# Patient Record
Sex: FEMALE | Race: WHITE | NOT HISPANIC OR LATINO | URBAN - METROPOLITAN AREA
[De-identification: names, ages, dates, MRNs, and addresses within clinical notes are randomized per-mention and may not be internally consistent; named-entity substitution may affect disease eponyms.]

---

## 2017-01-17 ENCOUNTER — OUTPATIENT (OUTPATIENT)
Dept: OUTPATIENT SERVICES | Facility: HOSPITAL | Age: 77
LOS: 1 days | End: 2017-01-17

## 2017-01-17 ENCOUNTER — INPATIENT (INPATIENT)
Facility: HOSPITAL | Age: 77
LOS: 2 days | Discharge: NURSING, REHAB OR LONG-TERM CARE | End: 2017-01-20
Payer: MEDICARE

## 2017-01-17 PROCEDURE — 73560 X-RAY EXAM OF KNEE 1 OR 2: CPT | Mod: 26,LT

## 2017-01-18 ENCOUNTER — OUTPATIENT (OUTPATIENT)
Dept: OUTPATIENT SERVICES | Facility: HOSPITAL | Age: 77
LOS: 1 days | End: 2017-01-18

## 2017-01-19 ENCOUNTER — OUTPATIENT (OUTPATIENT)
Dept: OUTPATIENT SERVICES | Facility: HOSPITAL | Age: 77
LOS: 1 days | End: 2017-01-19

## 2017-01-20 ENCOUNTER — INPATIENT (INPATIENT)
Facility: HOSPITAL | Age: 77
LOS: 11 days | Discharge: HOME | End: 2017-02-01

## 2017-02-01 ENCOUNTER — OUTPATIENT (OUTPATIENT)
Dept: OUTPATIENT SERVICES | Facility: HOSPITAL | Age: 77
LOS: 1 days | End: 2017-02-01

## 2017-02-24 ENCOUNTER — TRANSCRIPTION ENCOUNTER (OUTPATIENT)
Age: 77
End: 2017-02-24

## 2017-07-10 ENCOUNTER — APPOINTMENT (OUTPATIENT)
Dept: CARDIOLOGY | Facility: CLINIC | Age: 77
End: 2017-07-10

## 2017-07-20 ENCOUNTER — APPOINTMENT (OUTPATIENT)
Dept: CARDIOLOGY | Facility: CLINIC | Age: 77
End: 2017-07-20

## 2017-08-28 ENCOUNTER — APPOINTMENT (OUTPATIENT)
Dept: CARDIOLOGY | Facility: CLINIC | Age: 77
End: 2017-08-28
Payer: MEDICARE

## 2017-08-28 PROCEDURE — 99214 OFFICE O/P EST MOD 30 MIN: CPT

## 2017-09-26 ENCOUNTER — APPOINTMENT (OUTPATIENT)
Dept: CARDIOLOGY | Facility: CLINIC | Age: 77
End: 2017-09-26
Payer: MEDICARE

## 2017-09-26 PROCEDURE — 99213 OFFICE O/P EST LOW 20 MIN: CPT

## 2017-10-05 ENCOUNTER — APPOINTMENT (OUTPATIENT)
Dept: CARDIOLOGY | Facility: CLINIC | Age: 77
End: 2017-10-05
Payer: MEDICARE

## 2017-10-05 PROCEDURE — 99213 OFFICE O/P EST LOW 20 MIN: CPT

## 2017-10-10 ENCOUNTER — APPOINTMENT (OUTPATIENT)
Dept: CARDIOLOGY | Facility: CLINIC | Age: 77
End: 2017-10-10
Payer: MEDICARE

## 2017-10-10 PROCEDURE — 93351 STRESS TTE COMPLETE: CPT

## 2017-10-23 ENCOUNTER — APPOINTMENT (OUTPATIENT)
Dept: CARDIOLOGY | Facility: CLINIC | Age: 77
End: 2017-10-23
Payer: MEDICARE

## 2017-10-23 PROCEDURE — 99214 OFFICE O/P EST MOD 30 MIN: CPT

## 2017-11-02 ENCOUNTER — OUTPATIENT (OUTPATIENT)
Dept: OUTPATIENT SERVICES | Facility: HOSPITAL | Age: 77
LOS: 1 days | End: 2017-11-02
Payer: MEDICARE

## 2017-11-02 ENCOUNTER — OUTPATIENT (OUTPATIENT)
Dept: OUTPATIENT SERVICES | Facility: HOSPITAL | Age: 77
LOS: 1 days | End: 2017-11-02

## 2017-11-02 PROCEDURE — 93458 L HRT ARTERY/VENTRICLE ANGIO: CPT | Mod: 26

## 2017-11-06 ENCOUNTER — APPOINTMENT (OUTPATIENT)
Dept: CARDIOLOGY | Facility: CLINIC | Age: 77
End: 2017-11-06
Payer: MEDICARE

## 2017-11-06 PROCEDURE — 99214 OFFICE O/P EST MOD 30 MIN: CPT

## 2017-11-21 ENCOUNTER — RX RENEWAL (OUTPATIENT)
Age: 77
End: 2017-11-21

## 2017-11-29 ENCOUNTER — RECORD ABSTRACTING (OUTPATIENT)
Age: 77
End: 2017-11-29

## 2017-12-08 ENCOUNTER — APPOINTMENT (OUTPATIENT)
Dept: CARDIOLOGY | Facility: CLINIC | Age: 77
End: 2017-12-08

## 2018-04-27 ENCOUNTER — RECORD ABSTRACTING (OUTPATIENT)
Age: 78
End: 2018-04-27

## 2018-05-07 ENCOUNTER — APPOINTMENT (OUTPATIENT)
Dept: CARDIOLOGY | Facility: CLINIC | Age: 78
End: 2018-05-07
Payer: MEDICARE

## 2018-05-07 VITALS
HEART RATE: 62 BPM | SYSTOLIC BLOOD PRESSURE: 110 MMHG | DIASTOLIC BLOOD PRESSURE: 60 MMHG | WEIGHT: 144 LBS | HEIGHT: 60 IN | OXYGEN SATURATION: 99 % | BODY MASS INDEX: 28.27 KG/M2

## 2018-05-07 DIAGNOSIS — Z87.39 PERSONAL HISTORY OF OTHER DISEASES OF THE MUSCULOSKELETAL SYSTEM AND CONNECTIVE TISSUE: ICD-10-CM

## 2018-05-07 DIAGNOSIS — I47.2 VENTRICULAR TACHYCARDIA: ICD-10-CM

## 2018-05-07 DIAGNOSIS — Z87.891 PERSONAL HISTORY OF NICOTINE DEPENDENCE: ICD-10-CM

## 2018-05-07 DIAGNOSIS — Z86.79 PERSONAL HISTORY OF OTHER DISEASES OF THE CIRCULATORY SYSTEM: ICD-10-CM

## 2018-05-07 DIAGNOSIS — Z78.9 OTHER SPECIFIED HEALTH STATUS: ICD-10-CM

## 2018-05-07 PROCEDURE — 99214 OFFICE O/P EST MOD 30 MIN: CPT

## 2018-05-07 RX ORDER — AMLODIPINE BESYLATE 5 MG/1
5 TABLET ORAL
Refills: 0 | Status: DISCONTINUED | COMMUNITY
End: 2018-05-07

## 2018-05-07 RX ORDER — METOPROLOL SUCCINATE 25 MG/1
25 TABLET, EXTENDED RELEASE ORAL DAILY
Qty: 90 | Refills: 1 | Status: DISCONTINUED | COMMUNITY
Start: 2017-11-21 | End: 2018-05-07

## 2018-07-05 ENCOUNTER — RX RENEWAL (OUTPATIENT)
Age: 78
End: 2018-07-05

## 2019-01-07 ENCOUNTER — RX RENEWAL (OUTPATIENT)
Age: 79
End: 2019-01-07

## 2019-05-13 ENCOUNTER — APPOINTMENT (OUTPATIENT)
Dept: CARDIOLOGY | Facility: CLINIC | Age: 79
End: 2019-05-13

## 2019-06-24 ENCOUNTER — APPOINTMENT (OUTPATIENT)
Dept: CARDIOLOGY | Facility: CLINIC | Age: 79
End: 2019-06-24

## 2019-07-09 ENCOUNTER — NON-APPOINTMENT (OUTPATIENT)
Age: 79
End: 2019-07-09

## 2019-07-09 ENCOUNTER — APPOINTMENT (OUTPATIENT)
Dept: CARDIOLOGY | Facility: CLINIC | Age: 79
End: 2019-07-09
Payer: MEDICARE

## 2019-07-09 VITALS
DIASTOLIC BLOOD PRESSURE: 70 MMHG | OXYGEN SATURATION: 98 % | HEIGHT: 60 IN | WEIGHT: 133 LBS | SYSTOLIC BLOOD PRESSURE: 152 MMHG | BODY MASS INDEX: 26.11 KG/M2 | HEART RATE: 66 BPM

## 2019-07-09 PROCEDURE — 93000 ELECTROCARDIOGRAM COMPLETE: CPT

## 2019-07-09 PROCEDURE — 99214 OFFICE O/P EST MOD 30 MIN: CPT

## 2019-07-09 RX ORDER — CHOLESTYRAMINE 4 G/9G
4 POWDER, FOR SUSPENSION ORAL DAILY
Refills: 0 | Status: DISCONTINUED | COMMUNITY
End: 2019-07-09

## 2019-07-09 RX ORDER — TOBRAMYCIN AND DEXAMETHASONE 3; 1 MG/ML; MG/ML
0.3-0.1 SUSPENSION/ DROPS OPHTHALMIC
Qty: 5 | Refills: 0 | Status: DISCONTINUED | COMMUNITY
Start: 2017-09-13 | End: 2019-07-09

## 2019-07-09 RX ORDER — ROSUVASTATIN CALCIUM 5 MG/1
5 TABLET, FILM COATED ORAL
Refills: 0 | Status: DISCONTINUED | COMMUNITY
End: 2019-07-09

## 2019-07-09 RX ORDER — METOPROLOL SUCCINATE 100 MG/1
100 TABLET, EXTENDED RELEASE ORAL
Qty: 45 | Refills: 3 | Status: DISCONTINUED | COMMUNITY
Start: 2018-07-05 | End: 2019-07-09

## 2019-07-09 RX ORDER — ASPIRIN 81 MG
81 TABLET, DELAYED RELEASE (ENTERIC COATED) ORAL DAILY
Qty: 30 | Refills: 3 | Status: DISCONTINUED | COMMUNITY
End: 2019-07-09

## 2019-07-09 RX ORDER — DICYCLOMINE HYDROCHLORIDE 20 MG/1
20 TABLET ORAL DAILY
Refills: 0 | Status: DISCONTINUED | COMMUNITY
End: 2019-07-09

## 2019-07-09 RX ORDER — VENLAFAXINE HCL 75 MG
75 TABLET ORAL DAILY
Refills: 0 | Status: DISCONTINUED | COMMUNITY
End: 2019-07-09

## 2019-07-09 RX ORDER — MIRTAZAPINE 30 MG/1
30 TABLET, FILM COATED ORAL
Refills: 0 | Status: DISCONTINUED | COMMUNITY
End: 2019-07-09

## 2019-07-09 RX ORDER — VALSARTAN 320 MG/1
320 TABLET ORAL DAILY
Refills: 0 | Status: DISCONTINUED | COMMUNITY
End: 2019-07-09

## 2019-07-09 RX ORDER — MIRTAZAPINE 15 MG/1
15 TABLET, FILM COATED ORAL
Qty: 90 | Refills: 0 | Status: DISCONTINUED | COMMUNITY
Start: 2017-06-19 | End: 2019-07-09

## 2019-07-09 NOTE — HISTORY OF PRESENT ILLNESS
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 Class I. Functional status.\par NSVT EP no recurrence. No syncopal episode.\par Hypertension. Recent changes in medication. \par Hyperlipidemia. On cholesterol-lowering agent be a much better control.\par fibromyalgia mild-mod active controlled\par statin intolerance\par thyroid nodules

## 2019-07-09 NOTE — PHYSICAL EXAM
[Normal Appearance] : normal appearance [General Appearance - Well Developed] : well developed [General Appearance - Well Nourished] : well nourished [Well Groomed] : well groomed [No Deformities] : no deformities [Eyelids - No Xanthelasma] : the eyelids demonstrated no xanthelasmas [General Appearance - In No Acute Distress] : no acute distress [Normal Conjunctiva] : the conjunctiva exhibited no abnormalities [No Oral Pallor] : no oral pallor [Normal Jugular Venous V Waves Present] : normal jugular venous V waves present [Normal Jugular Venous A Waves Present] : normal jugular venous A waves present [No Jugular Venous Jacinto A Waves] : no jugular venous jacinto A waves [Heart Rate And Rhythm] : heart rate and rhythm were normal [Exaggerated Use Of Accessory Muscles For Inspiration] : no accessory muscle use [Auscultation Breath Sounds / Voice Sounds] : lungs were clear to auscultation bilaterally [Respiration, Rhythm And Depth] : normal respiratory rhythm and effort [Abdomen Soft] : soft [Murmurs] : no murmurs present [Heart Sounds] : normal S1 and S2 [] : no rash [Cyanosis, Localized] : no localized cyanosis [Abnormal Walk] : normal gait [Nail Clubbing] : no clubbing of the fingernails [Mood] : the mood was normal [Affect] : the affect was normal [Oriented To Time, Place, And Person] : oriented to person, place, and time [No Anxiety] : not feeling anxious

## 2019-07-09 NOTE — DISCUSSION/SUMMARY
[FreeTextEntry1] : #1 nonischemic cardiomyopathy. Preserved systolic function. Possible and function surface. Increase and coronary angiography, which was normal. Continue lifestyle and risk factor modifications.\par #2 hypertensive heart diseaseWithout any congestive heart failure. Mild renal insufficiency. Nonsmoker. Uncontrolled blood pressure. Possible noncompliance. Increase metoprolol back to 50 mg. Follow blood pressure. If remains uncontrolled. Would change metoprolol to labetalol or consider addition of amlodipine to the present regimen.\par She will also have an echocardiogram for followup on an ejection fraction of motion valvular regurgitation and pulmonary artery systolic pressure.\par #3 hyperlipidemia. Low dose of cholesterol-lowering agent. Unable to tolerate higher dose of medications. 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 No signs of left to right heart failure. Continue blood pressure, heart rate control.\par #5 Considering no significant symptomatic or obstructive vascular disease. After reviewing the risks and benefits of aspirin. She has decided to stop aspirin therapy.\par \par Considering no significant symptomatic or obstructive vascular disease. After reviewing the risks and benefits of aspirin. She has decided to stop aspirin therapy.\par I have reviewed above at length. I answered all the questions. Patient verbalized understanding\par Thank you very much for allowing me to participate in your patient's care. Please feel free to call me for any questions.\par Follow up otherwise in one month or for any change in her symptoms.\par

## 2019-07-09 NOTE — ASSESSMENT
[FreeTextEntry1] : \par past tests for reference:\par \par Invasive coronary angiography was reviewed. Showed normal coronaries. 2017\par \par cardiac cath 2013- no obstructive CAD\par Echocardiogram 2/11/2016.  LV ejection fraction 60%.  Borderline left atrial enlargement.  Fibrocalcific aortic valve with normal opening.  Trace mitral and tricuspid regurgitation.  Overall no significant change is noted.\par \par Echocardiogram dated July 21, 2017 and ejection fraction 60-65%. Left atrial size 4.1. Sigmoid septum. Mild diastolic LV dysfunction. Pulmonary artery systolic pressure 33 mm mercury.\par \par Stress echocardiogram October 10, 2017 . 4 minutes and 13 seconds. 5 METs. Abnormal EKG changes suggestive of ischemia. Normal blood pressure and heart rate response. No chest pain.\par Echocardiogram portion of the test showed hypokinesis of mid inferolateral region, suggestive of ischemia\par \par Reviewed July 9 thousand 19\par EKG. July 9, 2019 p.m., dictation, hypertension, for interpretation, normal sinus rhythm.\par Labs , triglycerides 63, sodium 142, potassium 4.2, creatinine 1.01 LFT normal. Total cholesterol 180 TSH 1.13 HDL 50

## 2019-07-09 NOTE — REASON FOR VISIT
[Follow-Up - Clinic] : a clinic follow-up of [Hyperlipidemia] : hyperlipidemia [Hypertension] : hypertension [FreeTextEntry1] : 79-year-old female comes in for follow up consultation to review labs.\par Denies any complaint of chest pain, shortness of breath, PND, orthopnea, palpitation.\par Has been active. Has stable weight.\par She has been taking lower dose of metoprolol at 25 mg.\par She had recent labs done in your office.\par She is not taking her Crestor recently.\par She doesn't easy bruising on aspirin.\par No recent hospitalization\par She had early morning fatigue, which has improved after stopping antidepressant

## 2019-07-09 NOTE — REVIEW OF SYSTEMS
[see HPI] : see HPI [Joint Pain] : joint pain [Joint Stiffness] : joint stiffness [Negative] : Heme/Lymph [Muscle Cramps] : no muscle cramps [Joint Swelling] : no joint swelling [Limb Weakness (Paresis)] : no limb weakness

## 2019-08-07 ENCOUNTER — APPOINTMENT (OUTPATIENT)
Dept: CARDIOLOGY | Facility: CLINIC | Age: 79
End: 2019-08-07
Payer: MEDICARE

## 2019-08-07 VITALS
SYSTOLIC BLOOD PRESSURE: 148 MMHG | BODY MASS INDEX: 26.7 KG/M2 | OXYGEN SATURATION: 96 % | DIASTOLIC BLOOD PRESSURE: 66 MMHG | HEART RATE: 67 BPM | WEIGHT: 136 LBS | HEIGHT: 60 IN

## 2019-08-07 PROCEDURE — 99214 OFFICE O/P EST MOD 30 MIN: CPT

## 2019-08-07 PROCEDURE — 93306 TTE W/DOPPLER COMPLETE: CPT

## 2019-08-07 RX ORDER — CHLORTHALIDONE 50 MG/1
50 TABLET ORAL DAILY
Refills: 0 | Status: DISCONTINUED | COMMUNITY
End: 2019-08-07

## 2019-08-07 RX ORDER — METOPROLOL SUCCINATE 50 MG/1
50 TABLET, EXTENDED RELEASE ORAL
Qty: 90 | Refills: 3 | Status: DISCONTINUED | COMMUNITY
End: 2019-08-07

## 2019-08-07 NOTE — ASSESSMENT
[FreeTextEntry1] : \par past tests for reference:\par \par Invasive coronary angiography was reviewed. Showed normal coronaries. 2017\par \par cardiac cath 2013- no obstructive CAD\par Echocardiogram 2/11/2016.  LV ejection fraction 60%.  Borderline left atrial enlargement.  Fibrocalcific aortic valve with normal opening.  Trace mitral and tricuspid regurgitation.  Overall no significant change is noted.\par \par Echocardiogram dated July 21, 2017 and ejection fraction 60-65%. Left atrial size 4.1. Sigmoid septum. Mild diastolic LV dysfunction. Pulmonary artery systolic pressure 33 mm mercury.\par \par Stress echocardiogram October 10, 2017 . 4 minutes and 13 seconds. 5 METs. Abnormal EKG changes suggestive of ischemia. Normal blood pressure and heart rate response. No chest pain.\par Echocardiogram portion of the test showed hypokinesis of mid inferolateral region, suggestive of ischemia\par \par EKG. July 9, 2019 ., dictation, hypertension, for interpretation, normal sinus rhythm.\par Labs , triglycerides 63, sodium 142, potassium 4.2, creatinine 1.01 LFT normal. Total cholesterol 180 TSH 1.13 HDL 50\par \par Reviewed on August 7, 2019.\par Echocardiogram 8/7/19 ventricle ejection fraction 60% minimal mitral insufficiency Moderate left atrial enlargement, mild diastolic dysfunction, pulmonary pressures of 32 mm, mild tricuspid insufficiency

## 2019-08-07 NOTE — DISCUSSION/SUMMARY
[FreeTextEntry1] : #1 nonischemic cardiomyopathy. Preserved systolic function. Coronary angiography showed no significant obstructive diseaseContinue lifestyle and risk factor modifications.\par #2 hypertensive heart disease Without any congestive heart failure. Mild renal insufficiency. Nonsmoker. Uncontrolled blood pressure.Recommended continued metoprolol 25 mg and  restart chlorthalidone at 25 mg. If continued on control blood pressure then I would change metoprolol to labetalol or change metoprolol to amlodipine at the next office visit.\par #3 hyperlipidemia. Low dose of cholesterol-lowering agent. Unable to tolerate higher dose of medications. 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 No signs of left to right heart failure. Continue blood pressure, heart rate control. No significant worsening noted. There is moderate left atrial enlargement, which could be related to hypertensive heart disease.\par \par \par Considering no significant symptomatic or obstructive vascular disease. After reviewing the risks and benefits of aspirin. She has decided to stop aspirin therapy.\par I have reviewed above at length. I answered all the questions. Patient verbalized understanding\par Thank you very much for allowing me to participate in your patient's care. Please feel free to call me for any questions.\par Follow up otherwise in one month or for any change in her symptoms.\par

## 2019-08-07 NOTE — REVIEW OF SYSTEMS
[Joint Pain] : joint pain [see HPI] : see HPI [Joint Swelling] : no joint swelling [Joint Stiffness] : joint stiffness [Muscle Cramps] : no muscle cramps [Limb Weakness (Paresis)] : no limb weakness [Negative] : Heme/Lymph

## 2019-08-07 NOTE — REASON FOR VISIT
[Follow-Up - Clinic] : a clinic follow-up of [Hyperlipidemia] : hyperlipidemia [Hypertension] : hypertension [Medication Management] : Medication management [FreeTextEntry1] : 79-year-old female comes in for followup consultation today view echocardiogram.\par Since last seen it appears she could not tolerate metoprolol at 50 mg and she has cut it down to 25 mg with better tolerance.\par She also stopped taking chlorthalidone, as she talked it is giving her diarrhea. There is some improvement, but not significant.\par Denies any complaint of chest pain, shortness of breath, PND, orthopnea, palpitation.\par Has been active. Has stable weight.\par She is taking her rosuvastatin 5 mg daily\par She doesn't Take aspirin because of easy bruising\par No recent hospitalization\par She had early morning fatigue, which has improved after stopping antidepressant

## 2019-08-07 NOTE — PHYSICAL EXAM
[Normal Appearance] : normal appearance [General Appearance - Well Developed] : well developed [Well Groomed] : well groomed [General Appearance - Well Nourished] : well nourished [No Deformities] : no deformities [General Appearance - In No Acute Distress] : no acute distress [Normal Conjunctiva] : the conjunctiva exhibited no abnormalities [Eyelids - No Xanthelasma] : the eyelids demonstrated no xanthelasmas [Normal Jugular Venous A Waves Present] : normal jugular venous A waves present [No Oral Pallor] : no oral pallor [Normal Jugular Venous V Waves Present] : normal jugular venous V waves present [No Jugular Venous Jacinto A Waves] : no jugular venous jacinto A waves [Respiration, Rhythm And Depth] : normal respiratory rhythm and effort [Auscultation Breath Sounds / Voice Sounds] : lungs were clear to auscultation bilaterally [Exaggerated Use Of Accessory Muscles For Inspiration] : no accessory muscle use [Heart Sounds] : normal S1 and S2 [Heart Rate And Rhythm] : heart rate and rhythm were normal [Abdomen Soft] : soft [Murmurs] : no murmurs present [Abnormal Walk] : normal gait [Cyanosis, Localized] : no localized cyanosis [Nail Clubbing] : no clubbing of the fingernails [Oriented To Time, Place, And Person] : oriented to person, place, and time [] : no rash [Mood] : the mood was normal [Affect] : the affect was normal [No Anxiety] : not feeling anxious

## 2019-09-03 ENCOUNTER — APPOINTMENT (OUTPATIENT)
Dept: CARDIOLOGY | Facility: CLINIC | Age: 79
End: 2019-09-03
Payer: MEDICARE

## 2019-09-03 VITALS
SYSTOLIC BLOOD PRESSURE: 138 MMHG | DIASTOLIC BLOOD PRESSURE: 72 MMHG | BODY MASS INDEX: 26.5 KG/M2 | WEIGHT: 135 LBS | OXYGEN SATURATION: 96 % | HEART RATE: 52 BPM | HEIGHT: 60 IN

## 2019-09-03 PROCEDURE — 99214 OFFICE O/P EST MOD 30 MIN: CPT

## 2019-09-03 RX ORDER — METOPROLOL SUCCINATE 25 MG/1
25 TABLET, EXTENDED RELEASE ORAL DAILY
Refills: 0 | Status: DISCONTINUED | COMMUNITY
End: 2019-09-03

## 2019-09-03 NOTE — REVIEW OF SYSTEMS
[Joint Pain] : joint pain [see HPI] : see HPI [Joint Stiffness] : joint stiffness [Negative] : Heme/Lymph [Joint Swelling] : no joint swelling [Muscle Cramps] : no muscle cramps [Limb Weakness (Paresis)] : no limb weakness

## 2019-09-03 NOTE — REASON FOR VISIT
[Hyperlipidemia] : hyperlipidemia [Follow-Up - Clinic] : a clinic follow-up of [Hypertension] : hypertension [Medication Management] : Medication management [FreeTextEntry1] : 79-year-old female comes in for followup consultation for medical management. After recent changes in her metoprolol and chlorthalidone. It appears decreasing dose of chlorthalidone and 25 mg did not make a difference in her diarrhea. It also appears that she did not feel well on metoprolol at 25 mg and now back to 50 mg with improved nocturnal symptoms.\par She is now seen gastroenterologist yet.\par Denies any complaint of chest pain, shortness of breath, PND, orthopnea, palpitation.\par Has been active. Has stable weight.\par She is taking her rosuvastatin 5 mg daily\par She doesn't Take aspirin because of easy bruising\par No recent hospitalization\par She had early morning fatigue, which has improved after stopping antidepressant

## 2019-09-03 NOTE — PHYSICAL EXAM
[Normal Appearance] : normal appearance [General Appearance - Well Developed] : well developed [Well Groomed] : well groomed [No Deformities] : no deformities [General Appearance - Well Nourished] : well nourished [Normal Conjunctiva] : the conjunctiva exhibited no abnormalities [General Appearance - In No Acute Distress] : no acute distress [No Oral Pallor] : no oral pallor [Eyelids - No Xanthelasma] : the eyelids demonstrated no xanthelasmas [Normal Jugular Venous A Waves Present] : normal jugular venous A waves present [Normal Jugular Venous V Waves Present] : normal jugular venous V waves present [No Jugular Venous Jacinto A Waves] : no jugular venous jacinto A waves [Exaggerated Use Of Accessory Muscles For Inspiration] : no accessory muscle use [Respiration, Rhythm And Depth] : normal respiratory rhythm and effort [Auscultation Breath Sounds / Voice Sounds] : lungs were clear to auscultation bilaterally [Heart Rate And Rhythm] : heart rate and rhythm were normal [Murmurs] : no murmurs present [Heart Sounds] : normal S1 and S2 [Abdomen Soft] : soft [Nail Clubbing] : no clubbing of the fingernails [Abnormal Walk] : normal gait [] : no rash [Cyanosis, Localized] : no localized cyanosis [Mood] : the mood was normal [Affect] : the affect was normal [Oriented To Time, Place, And Person] : oriented to person, place, and time [No Anxiety] : not feeling anxious

## 2020-01-06 ENCOUNTER — APPOINTMENT (OUTPATIENT)
Dept: CARDIOLOGY | Facility: CLINIC | Age: 80
End: 2020-01-06
Payer: MEDICARE

## 2020-01-06 VITALS
RESPIRATION RATE: 16 BRPM | SYSTOLIC BLOOD PRESSURE: 120 MMHG | HEART RATE: 68 BPM | HEIGHT: 65 IN | DIASTOLIC BLOOD PRESSURE: 68 MMHG | WEIGHT: 140 LBS | BODY MASS INDEX: 23.32 KG/M2

## 2020-01-06 VITALS — OXYGEN SATURATION: 96 % | DIASTOLIC BLOOD PRESSURE: 64 MMHG | SYSTOLIC BLOOD PRESSURE: 120 MMHG

## 2020-01-06 PROCEDURE — 99214 OFFICE O/P EST MOD 30 MIN: CPT

## 2020-01-06 NOTE — DISCUSSION/SUMMARY
[FreeTextEntry1] : 79-year-old female comes in for followup consultation with above medical history active medical problems as noted below.\par #1 dizziness. Movement of the neck. No vertiginous symptoms. No orthostatic shifts. Possible vertebral basilar insufficiency. Recommended labs.\par Recommended carotid/vertebral arterial Doppler study.\par If above tests are negative, then consider further evaluation with ENT/neurologist, and we will have a Holter monitor to rule out any cardiac arrhythmias.\par #2 nonischemic cardiomyopathy. Preserved systolic function. Coronary angiography showed no significant obstructive diseaseContinue lifestyle and risk factor modifications.\par #3 hypertensive heart disease Without any congestive heart failure. Mild renal insufficiency. Nonsmoker. Blood pressure is stable. Continue present medications.\par She will take low-salt diet.\par She will increase her exercises.\par #3 hyperlipidemia. Low dose of cholesterol-lowering agent. Unable to tolerate higher dose of medications. 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 No signs of left to right heart failure. Continue blood pressure, heart rate control. No significant worsening noted. There is moderate left atrial enlargement, which could be related to hypertensive heart disease.\par Compliance with medication and diet have been discussed.\par \par I have reviewed above at length. I answered all the questions. Patient verbalized understanding\par Thank you very much for allowing me to participate in your patient's care. Please feel free to call me for any questions.\par \par

## 2020-01-06 NOTE — PHYSICAL EXAM
[Normal Appearance] : normal appearance [Well Groomed] : well groomed [General Appearance - Well Developed] : well developed [General Appearance - Well Nourished] : well nourished [No Deformities] : no deformities [General Appearance - In No Acute Distress] : no acute distress [Normal Conjunctiva] : the conjunctiva exhibited no abnormalities [No Oral Pallor] : no oral pallor [Eyelids - No Xanthelasma] : the eyelids demonstrated no xanthelasmas [Normal Jugular Venous A Waves Present] : normal jugular venous A waves present [No Jugular Venous Jacinto A Waves] : no jugular venous jacinto A waves [Normal Jugular Venous V Waves Present] : normal jugular venous V waves present [Respiration, Rhythm And Depth] : normal respiratory rhythm and effort [Exaggerated Use Of Accessory Muscles For Inspiration] : no accessory muscle use [Auscultation Breath Sounds / Voice Sounds] : lungs were clear to auscultation bilaterally [Heart Rate And Rhythm] : heart rate and rhythm were normal [Heart Sounds] : normal S1 and S2 [Abdomen Soft] : soft [Abnormal Walk] : normal gait [Murmurs] : no murmurs present [Nail Clubbing] : no clubbing of the fingernails [Cyanosis, Localized] : no localized cyanosis [] : no rash [Oriented To Time, Place, And Person] : oriented to person, place, and time [Affect] : the affect was normal [Mood] : the mood was normal [No Anxiety] : not feeling anxious

## 2020-01-06 NOTE — REVIEW OF SYSTEMS
[Joint Pain] : joint pain [Joint Swelling] : no joint swelling [Joint Stiffness] : joint stiffness [Limb Weakness (Paresis)] : no limb weakness [Muscle Cramps] : no muscle cramps [see HPI] : see HPI [Dizziness] : dizziness [Negative] : Heme/Lymph

## 2020-01-06 NOTE — REASON FOR VISIT
[Dizziness] : dizziness [Follow-Up - Clinic] : a clinic follow-up of [Hypertension] : hypertension [Hyperlipidemia] : hyperlipidemia [FreeTextEntry1] : 79-year-old female comes in for followup consultation did complain of intermittent dizziness. Mainly, with quick changes in the movement of the neck without any vertiginous symptoms, associated gait abnormality, fall, headache, visual disturbances, palpitations, chest pain.\par She has been stable in relation to adjacent exertion without any PND, orthopnea, or pedal edema.\par Her diarrheal illness, is stable on low material.\par She, states she's been taking most of her medications regularly.\par She has been active and going to local gym on a regular basis. Still not during holidays,  just started today Again\par Has been active. Has stable weight.\par She is taking her rosuvastatin 5 mg daily\par She doesn't Take aspirin because of easy bruising\par She has been taking her antidepressants \par No recent hospitalization\par  [Medication Management] : Medication management

## 2020-01-09 ENCOUNTER — APPOINTMENT (OUTPATIENT)
Dept: CARDIOLOGY | Facility: CLINIC | Age: 80
End: 2020-01-09
Payer: MEDICARE

## 2020-01-09 PROCEDURE — 93880 EXTRACRANIAL BILAT STUDY: CPT

## 2020-01-13 ENCOUNTER — RESULT REVIEW (OUTPATIENT)
Age: 80
End: 2020-01-13

## 2020-01-13 RX ORDER — ROSUVASTATIN CALCIUM 5 MG/1
5 TABLET, FILM COATED ORAL
Qty: 45 | Refills: 3 | Status: DISCONTINUED | COMMUNITY
Start: 2019-07-09 | End: 2020-01-13

## 2020-01-16 ENCOUNTER — MOBILE ON CALL (OUTPATIENT)
Age: 80
End: 2020-01-16

## 2020-02-02 ENCOUNTER — TRANSCRIPTION ENCOUNTER (OUTPATIENT)
Age: 80
End: 2020-02-02

## 2021-02-04 ENCOUNTER — NON-APPOINTMENT (OUTPATIENT)
Age: 81
End: 2021-02-04

## 2021-02-04 ENCOUNTER — APPOINTMENT (OUTPATIENT)
Dept: CARDIOLOGY | Facility: CLINIC | Age: 81
End: 2021-02-04
Payer: MEDICARE

## 2021-02-04 VITALS
HEART RATE: 76 BPM | SYSTOLIC BLOOD PRESSURE: 126 MMHG | DIASTOLIC BLOOD PRESSURE: 60 MMHG | HEIGHT: 65 IN | OXYGEN SATURATION: 99 % | BODY MASS INDEX: 21.99 KG/M2 | TEMPERATURE: 97.3 F | WEIGHT: 132 LBS

## 2021-02-04 DIAGNOSIS — Z87.898 PERSONAL HISTORY OF OTHER SPECIFIED CONDITIONS: ICD-10-CM

## 2021-02-04 PROCEDURE — 99214 OFFICE O/P EST MOD 30 MIN: CPT

## 2021-02-04 PROCEDURE — 93000 ELECTROCARDIOGRAM COMPLETE: CPT

## 2021-02-04 RX ORDER — LOSARTAN POTASSIUM 100 MG/1
100 TABLET, FILM COATED ORAL DAILY
Refills: 0 | Status: DISCONTINUED | COMMUNITY
End: 2021-02-04

## 2021-02-04 NOTE — REVIEW OF SYSTEMS
[Joint Pain] : joint pain [Joint Swelling] : no joint swelling [Joint Stiffness] : joint stiffness [Muscle Cramps] : no muscle cramps [Limb Weakness (Paresis)] : no limb weakness [see HPI] : see HPI [Dizziness] : dizziness [Negative] : Heme/Lymph

## 2021-02-04 NOTE — REASON FOR VISIT
[Follow-Up - Clinic] : a clinic follow-up of [Dizziness] : dizziness [Hyperlipidemia] : hyperlipidemia [Hypertension] : hypertension [Medication Management] : Medication management [FreeTextEntry1] : 81-year-old female without any vertiginous or dizziness symptoms.  No associated gait abnormality, fall, headache, visual disturbances, palpitations, chest pain.\par \par She, states she's been taking most of her medications regularly.\par \par She has been more active, lost weight with a new puppy.  \par \par She is taking her rosuvastatin 5 mg daily; does not report any side effects.  No abdominal pain, nausea or vomiting.  Her LFTs in August 2020 with Dr. Gan were abnormal; she had further testing with ultrasound of the liver and is being followed by him.\par \par She doesn't Take aspirin because of easy bruising\par \par No recent hospitalization\par

## 2021-02-04 NOTE — DISCUSSION/SUMMARY
[FreeTextEntry1] : 81-year-old female comes in for followup consultation with above medical history active medical problems as noted below.\par \par #1 abnormal LFT in August 2020. Asymptomatic: The patient says that she had ultrasound of her liver and subsequent follow-up with Dr. Gan.  I have encouraged her to continue this follow-up\par #2 nonischemic cardiomyopathy. Preserved systolic function. Coronary angiography showed no significant obstructive diseaseContinue lifestyle and risk factor modifications.\par #3 hypertensive heart disease Without any congestive heart failure. Mild renal insufficiency. Nonsmoker. Blood pressure is stable. Continue present medications.\par She will take low-salt diet.\par She will increase her exercises.\par #4 hyperlipidemia. Low dose of cholesterol-lowering agent. Unable to tolerate higher dose of medications. Continue low saturated fat, carbohydrate intake.  Last LDL in August 2020 was 75\par \par Compliance with medication and diet have been discussed.\par \par I have reviewed above at length. I answered all the questions. Patient verbalized understanding\par Thank you very much for allowing me to participate in your patient's care. Please feel free to call me for any questions.\par \par

## 2021-02-04 NOTE — ASSESSMENT
[FreeTextEntry1] : \par Reviewed today:\par Invasive coronary angiography was reviewed. Showed normal coronaries. 2017\par \par Echocardiogram 2/11/2016.  LV ejection fraction 60%.  Borderline left atrial enlargement.  Fibrocalcific aortic valve with normal opening.  Trace mitral and tricuspid regurgitation.  Overall no significant change is noted.\par \par Echocardiogram dated July 21, 2017 and ejection fraction 60-65%. Left atrial size 4.1. Sigmoid septum. Mild diastolic LV dysfunction. Pulmonary artery systolic pressure 33 mm mercury.\par \par Stress echocardiogram October 10, 2017 . 4 minutes and 13 seconds. 5 METs. Abnormal EKG changes suggestive of ischemia. Normal blood pressure and heart rate response. No chest pain.\par Echocardiogram portion of the test showed hypokinesis of mid inferolateral region, suggestive of ischemia\par \par EKG. July 9, 2019 ., dictation, hypertension, for interpretation, normal sinus rhythm.\par Labs , triglycerides 63, sodium 142, potassium 4.2, creatinine 1.01 LFT normal. Total cholesterol 180 TSH 1.13 HDL 50\par \par Echocardiogram 8/7/19 ventricle ejection fraction 60% minimal mitral insufficiency Moderate left atrial enlargement, mild diastolic dysfunction, pulmonary pressures of 32 mm, mild tricuspid insufficiency

## 2021-02-04 NOTE — PHYSICAL EXAM
[General Appearance - Well Developed] : well developed [Normal Appearance] : normal appearance [Well Groomed] : well groomed [General Appearance - Well Nourished] : well nourished [No Deformities] : no deformities [General Appearance - In No Acute Distress] : no acute distress [Normal Conjunctiva] : the conjunctiva exhibited no abnormalities [Eyelids - No Xanthelasma] : the eyelids demonstrated no xanthelasmas [No Oral Pallor] : no oral pallor [Normal Jugular Venous A Waves Present] : normal jugular venous A waves present [Normal Jugular Venous V Waves Present] : normal jugular venous V waves present [No Jugular Venous Jacinto A Waves] : no jugular venous jacinto A waves [Respiration, Rhythm And Depth] : normal respiratory rhythm and effort [Exaggerated Use Of Accessory Muscles For Inspiration] : no accessory muscle use [Auscultation Breath Sounds / Voice Sounds] : lungs were clear to auscultation bilaterally [Heart Rate And Rhythm] : heart rate and rhythm were normal [Heart Sounds] : normal S1 and S2 [Murmurs] : no murmurs present [Abdomen Soft] : soft [Abdomen Tenderness] : non-tender [Abnormal Walk] : normal gait [Nail Clubbing] : no clubbing of the fingernails [Cyanosis, Localized] : no localized cyanosis [] : no rash [Oriented To Time, Place, And Person] : oriented to person, place, and time [Affect] : the affect was normal [Mood] : the mood was normal [No Anxiety] : not feeling anxious

## 2021-08-12 ENCOUNTER — APPOINTMENT (OUTPATIENT)
Dept: CARDIOLOGY | Facility: CLINIC | Age: 81
End: 2021-08-12
Payer: MEDICARE

## 2021-08-12 VITALS
HEART RATE: 75 BPM | DIASTOLIC BLOOD PRESSURE: 56 MMHG | SYSTOLIC BLOOD PRESSURE: 126 MMHG | WEIGHT: 131 LBS | OXYGEN SATURATION: 98 % | BODY MASS INDEX: 21.83 KG/M2 | HEIGHT: 65 IN | TEMPERATURE: 97.8 F

## 2021-08-12 DIAGNOSIS — Z86.19 PERSONAL HISTORY OF OTHER INFECTIOUS AND PARASITIC DISEASES: ICD-10-CM

## 2021-08-12 DIAGNOSIS — U07.1 COVID-19: ICD-10-CM

## 2021-08-12 PROCEDURE — 99214 OFFICE O/P EST MOD 30 MIN: CPT | Mod: CS

## 2021-08-12 RX ORDER — CHLORTHALIDONE 25 MG/1
25 TABLET ORAL DAILY
Refills: 0 | Status: DISCONTINUED | COMMUNITY
End: 2021-08-12

## 2021-08-12 RX ORDER — ROSUVASTATIN CALCIUM 5 MG/1
5 TABLET, FILM COATED ORAL
Refills: 0 | Status: DISCONTINUED | COMMUNITY
Start: 2020-01-13 | End: 2021-08-12

## 2021-08-12 NOTE — DISCUSSION/SUMMARY
[FreeTextEntry1] : 81-year-old female comes in for followup consultation with above medical history active medical problems as noted below.\par \par #1 abnormal LFT in August 2020. Asymptomatic: The patient says that she had ultrasound of her liver and subsequent follow-up with Dr. Gan.  I have encouraged her to continue this follow-up.  Mild elevation persists on most recent labs in July.\par #2  History of nonischemic cardiomyopathy.  She has preserved systolic function. Coronary angiography showed no significant obstructive disease. Continue lifestyle and risk factor modifications.  On beta-blockers and ACE inhibitors\par #3 hypertensive heart disease Without any congestive heart failure. Nonsmoker. Blood pressure is stable. Continue present medications.\par She will take low-salt diet.\par She will increase her exercises.\par #4 hyperlipidemia.  She was on low dose of cholesterol-lowering agent.  Crestor was stopped due to concerns of fatigue.  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  She was  unable to tolerate higher dose of medications. Continue low saturated fat, carbohydrate intake. \par \par Compliance with medication and diet have been discussed.\par \par We will follow up with Dr. Goldstein in 3 months\par

## 2021-08-12 NOTE — REVIEW OF SYSTEMS
[Feeling Fatigued] : feeling fatigued [Negative] : Psychiatric [de-identified] : Bruises superficial bilateral upper extremity

## 2021-08-12 NOTE — ASSESSMENT
[FreeTextEntry1] : \par Reviewed today:\par Invasive coronary angiography was reviewed. Showed normal coronaries. 2017\par \par Echocardiogram 2/11/2016.  LV ejection fraction 60%.  Borderline left atrial enlargement.  Fibrocalcific aortic valve with normal opening.  Trace mitral and tricuspid regurgitation.  Overall no significant change is noted.\par \par Echocardiogram dated July 21, 2017 and ejection fraction 60-65%. Left atrial size 4.1. Sigmoid septum. Mild diastolic LV dysfunction. Pulmonary artery systolic pressure 33 mm mercury.\par \par Stress echocardiogram October 10, 2017 . 4 minutes and 13 seconds. 5 METs. Abnormal EKG changes suggestive of ischemia. Normal blood pressure and heart rate response. No chest pain.\par Echocardiogram portion of the test showed hypokinesis of mid inferolateral region, suggestive of ischemia\par \par EKG. July 9, 2019 ., dictation, hypertension, for interpretation, normal sinus rhythm.\par Labs , triglycerides 63, sodium 142, potassium 4.2, creatinine 1.01 LFT normal. Total cholesterol 180 TSH 1.13 HDL 50\par \par Echocardiogram 8/7/19 ventricle ejection fraction 60% minimal mitral insufficiency Moderate left atrial enlargement, mild diastolic dysfunction, pulmonary pressures of 32 mm, mild tricuspid insufficiency\par \par Labs July 2021 reviewed: LDL 89.  Alkaline phos 168, AST 47, ALT 56.  Sugar 108.

## 2021-08-12 NOTE — PHYSICAL EXAM
[General Appearance - Well Developed] : well developed [Normal Appearance] : normal appearance [Well Groomed] : well groomed [General Appearance - Well Nourished] : well nourished [No Deformities] : no deformities [General Appearance - In No Acute Distress] : no acute distress [Normal Conjunctiva] : the conjunctiva exhibited no abnormalities [Eyelids - No Xanthelasma] : the eyelids demonstrated no xanthelasmas [No Oral Pallor] : no oral pallor [Normal Jugular Venous A Waves Present] : normal jugular venous A waves present [Normal Jugular Venous V Waves Present] : normal jugular venous V waves present [No Jugular Venous Jacinto A Waves] : no jugular venous jacinto A waves [Respiration, Rhythm And Depth] : normal respiratory rhythm and effort [Exaggerated Use Of Accessory Muscles For Inspiration] : no accessory muscle use [Auscultation Breath Sounds / Voice Sounds] : lungs were clear to auscultation bilaterally [Heart Rate And Rhythm] : heart rate and rhythm were normal [Heart Sounds] : normal S1 and S2 [Murmurs] : no murmurs present [Abdomen Soft] : soft [Abdomen Tenderness] : non-tender [Abnormal Walk] : normal gait [Nail Clubbing] : no clubbing of the fingernails [Cyanosis, Localized] : no localized cyanosis [] : no rash [FreeTextEntry1] : Bilateral upper extremity bruises [Oriented To Time, Place, And Person] : oriented to person, place, and time [Affect] : the affect was normal [Mood] : the mood was normal [No Anxiety] : not feeling anxious

## 2021-08-12 NOTE — REASON FOR VISIT
[FreeTextEntry1] : 81-year-old female :\par \par Since last office visit, no major cardiovascular events.  No associated gait abnormality, fall, headache, visual disturbances, palpitations, chest pain.\par \par She was taking her rosuvastatin 5 mg daily; did not report any side effects.  No abdominal pain, nausea or vomiting.  Her LFTs in August 2020 with Dr. Gan were abnormal; she had further testing with ultrasound of the liver and is being followed by him.  She has discontinued her statin due to fatigue\par \par She doesn't Take aspirin because of easy bruising\par \par No recent hospitalization\par  [Follow-Up - Clinic] : a clinic follow-up of [Dizziness] : dizziness [Hyperlipidemia] : hyperlipidemia [Hypertension] : hypertension [Medication Management] : Medication management

## 2021-10-29 ENCOUNTER — APPOINTMENT (OUTPATIENT)
Dept: CARDIOLOGY | Facility: CLINIC | Age: 81
End: 2021-10-29

## 2021-11-24 ENCOUNTER — APPOINTMENT (OUTPATIENT)
Dept: CARDIOLOGY | Facility: CLINIC | Age: 81
End: 2021-11-24
Payer: MEDICARE

## 2021-11-24 VITALS
WEIGHT: 131 LBS | SYSTOLIC BLOOD PRESSURE: 178 MMHG | DIASTOLIC BLOOD PRESSURE: 70 MMHG | BODY MASS INDEX: 21.83 KG/M2 | OXYGEN SATURATION: 94 % | HEIGHT: 65 IN | HEART RATE: 96 BPM

## 2021-11-24 PROCEDURE — 99214 OFFICE O/P EST MOD 30 MIN: CPT

## 2021-11-24 NOTE — ASSESSMENT
[FreeTextEntry1] : \par past tests for reference:\par \par Invasive coronary angiography was reviewed. Showed normal coronaries. 2017\par \par cardiac cath 2013- no obstructive CAD\par Echocardiogram 2/11/2016.  LV ejection fraction 60%.  Borderline left atrial enlargement.  Fibrocalcific aortic valve with normal opening.  Trace mitral and tricuspid regurgitation.  Overall no significant change is noted.\par \par Echocardiogram dated July 21, 2017 and ejection fraction 60-65%. Left atrial size 4.1. Sigmoid septum. Mild diastolic LV dysfunction. Pulmonary artery systolic pressure 33 mm mercury.\par \par Stress echocardiogram October 10, 2017 . 4 minutes and 13 seconds. 5 METs. Abnormal EKG changes suggestive of ischemia. Normal blood pressure and heart rate response. No chest pain.\par Echocardiogram portion of the test showed hypokinesis of mid inferolateral region, suggestive of ischemia\par \par EKG. July 9, 2019 ., dictation, hypertension, for interpretation, normal sinus rhythm.\par Labs , triglycerides 63, sodium 142, potassium 4.2, creatinine 1.01 LFT normal. Total cholesterol 180 TSH 1.13 HDL 50\par \par Reviewed on August 7, 2019.\par Echocardiogram 8/7/19 ventricle ejection fraction 60% minimal mitral insufficiency Moderate left atrial enlargement, mild diastolic dysfunction, pulmonary pressures of 32 mm, mild tricuspid insufficiency\par \par Reviewed on November 24, 2021\par Labs October 17, 2021 sodium 137 potassium 4.1 creatinine 0.7.  Carotid Doppler study January 9, 2020.\par Bilateral mild carotid atherosclerotic vascular disease.\par

## 2021-11-24 NOTE — REASON FOR VISIT
[Structural Heart and Valve Disease] : structural heart and valve disease [Hyperlipidemia] : hyperlipidemia [Hypertension] : hypertension [FreeTextEntry3] : Dr. Gan [FreeTextEntry1] : 81-year-old female comes in for follow-up consultation.  Since last seen she has noticed higher blood pressure.  She recently lost her son from cancer.\par Has not been able to do regular exercises\par Denies any increased salt or alcohol intake\par Has lost weight\par No chest pain.  No significant shortness of breath PND orthopnea palpitation dizziness lightheadedness near syncopal or syncopal event.\par No claudication.\par States compliance with medication\par Stable sleep pattern\par \par

## 2021-11-24 NOTE — REVIEW OF SYSTEMS
[Negative] : Heme/Lymph [Feeling Fatigued] : feeling fatigued [Joint Pain] : joint pain [Joint Stiffness] : joint stiffness [Under Stress] : under stress [Easy Bruising] : a tendency for easy bruising

## 2021-11-24 NOTE — DISCUSSION/SUMMARY
[FreeTextEntry1] : 81-year-old female comes in for followup consultation with above medical history active medical problems as noted below.\par #1  Carotid Doppler study for follow-up on carotid atherosclerotic vascular disease.\par #2 nonischemic cardiomyopathy. Preserved systolic function. Coronary angiography showed no significant obstructive disease. Continue lifestyle and risk factor modifications.  Follow-up echocardiogram.\par #3 hypertensive heart disease Without any congestive heart failure.  Significantly elevated.  Significant stress and grief.  Stable renal function.  Higher ventricular rate.  Increase metoprolol to 100 mg.  Follow-up blood pressure in a week.  If continued elevation increase lisinopril to 40 mg.\par She will take low-salt diet.\par She will increase her exercises.\par Repeat labs.\par #3 hyperlipidemia. Low dose of cholesterol-lowering agent. Unable to tolerate higher dose of medications. 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 No signs of left to right heart failure. Continue blood pressure, heart rate control. No significant worsening noted. There is moderate left atrial enlargement, which could be related to hypertensive heart disease.  This will be followed again with echocardiogram.\par Compliance with medication and diet have been discussed.\par #5 because of strong family history of thyroid cancer with multiple family members including her son and siblings recommended to discuss with you or see ENT specialist for further evaluation.  She states she has not had any ultrasound of thyroid done recently.\par \par I have reviewed above at length. I answered all the questions. Patient verbalized understanding\par Thank you very much for allowing me to participate in your patient's care. Please feel free to call me for any questions.\par \par Any significant change in clinical status she will call 911.\par

## 2021-11-24 NOTE — PHYSICAL EXAM
[General Appearance - Well Developed] : well developed [Normal Appearance] : normal appearance [Well Groomed] : well groomed [General Appearance - Well Nourished] : well nourished [No Deformities] : no deformities [General Appearance - In No Acute Distress] : no acute distress [Normal Conjunctiva] : the conjunctiva exhibited no abnormalities [Eyelids - No Xanthelasma] : the eyelids demonstrated no xanthelasmas [No Oral Pallor] : no oral pallor [Normal Jugular Venous A Waves Present] : normal jugular venous A waves present [Normal Jugular Venous V Waves Present] : normal jugular venous V waves present [No Jugular Venous Jacinto A Waves] : no jugular venous jacinto A waves [Respiration, Rhythm And Depth] : normal respiratory rhythm and effort [Exaggerated Use Of Accessory Muscles For Inspiration] : no accessory muscle use [Auscultation Breath Sounds / Voice Sounds] : lungs were clear to auscultation bilaterally [Heart Rate And Rhythm] : heart rate and rhythm were normal [Heart Sounds] : normal S1 and S2 [Murmurs] : no murmurs present [Abdomen Soft] : soft [Abnormal Walk] : normal gait [Nail Clubbing] : no clubbing of the fingernails [Cyanosis, Localized] : no localized cyanosis [] : no rash [Oriented To Time, Place, And Person] : oriented to person, place, and time [Affect] : the affect was normal [Mood] : the mood was normal [No Anxiety] : not feeling anxious

## 2021-11-28 ENCOUNTER — TRANSCRIPTION ENCOUNTER (OUTPATIENT)
Age: 81
End: 2021-11-28

## 2021-11-29 ENCOUNTER — APPOINTMENT (OUTPATIENT)
Dept: CARDIOLOGY | Facility: CLINIC | Age: 81
End: 2021-11-29
Payer: MEDICARE

## 2021-11-29 VITALS
OXYGEN SATURATION: 99 % | HEART RATE: 66 BPM | SYSTOLIC BLOOD PRESSURE: 162 MMHG | DIASTOLIC BLOOD PRESSURE: 62 MMHG | HEIGHT: 65 IN | BODY MASS INDEX: 21.66 KG/M2 | WEIGHT: 130 LBS

## 2021-11-29 PROCEDURE — 99214 OFFICE O/P EST MOD 30 MIN: CPT

## 2021-11-29 RX ORDER — LISINOPRIL 20 MG/1
20 TABLET ORAL
Qty: 90 | Refills: 1 | Status: DISCONTINUED | COMMUNITY
Start: 2021-02-04 | End: 2021-11-29

## 2021-11-29 NOTE — REVIEW OF SYSTEMS
[Feeling Fatigued] : feeling fatigued [Joint Pain] : joint pain [Joint Stiffness] : joint stiffness [Under Stress] : under stress [Easy Bruising] : a tendency for easy bruising [Negative] : Heme/Lymph

## 2021-11-29 NOTE — REASON FOR VISIT
[Structural Heart and Valve Disease] : structural heart and valve disease [Hyperlipidemia] : hyperlipidemia [Hypertension] : hypertension [FreeTextEntry3] : Dr. Gan [FreeTextEntry1] : 81-year-old female comes in for follow-up consultation.  Increase dose of metoprolol.  Tolerated well.  She is here to review her blood pressure again.\par She recently lost her son from cancer. \par Has not been able to do regular exercises\par Denies any increased salt or alcohol intake\par Has lost weight\par No chest pain.  No significant shortness of breath PND orthopnea palpitation dizziness lightheadedness near syncopal or syncopal event.\par No claudication.\par States compliance with medication\par Stable sleep pattern\par \par

## 2021-11-29 NOTE — DISCUSSION/SUMMARY
[FreeTextEntry1] : 81-year-old female comes in for followup consultation with above medical history active medical problems as noted below.\par #1  Carotid Doppler study for follow-up on carotid atherosclerotic vascular disease.\par #2 nonischemic cardiomyopathy. Preserved systolic function. Coronary angiography showed no significant obstructive disease. Continue lifestyle and risk factor modifications.  Follow-up echocardiogram.\par #3 hypertensive heart disease Without any congestive heart failure.  Significantly elevated.  Continue metoprolol 100 mg.  As needed we will may have to change it to labetalol for blood pressure control\par Change lisinopril to amlodipine/benazepril 5/20 milligrams.  Risk benefits alternatives side effects have been reviewed.  Prescription done.\par She will take low-salt diet.\par She will increase her exercises.\par She is traveling so we will see her in about 2 to 3 weeks.  If any side effects change in symptoms she will contact us immediately.\par High risk symptoms she will call 911.\par Overall goal long-term less than 130/80.\par #3 hyperlipidemia. Low dose of cholesterol-lowering agent. Unable to tolerate higher dose of medications. 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 No signs of left to right heart failure. Continue blood pressure, heart rate control. No significant worsening noted. There is moderate left atrial enlargement, which could be related to hypertensive heart disease.  This will be followed again with echocardiogram.\par Compliance with medication and diet have been discussed.\par #5 because of strong family history of thyroid cancer with multiple family members including her son and siblings recommended to discuss with you or see ENT specialist for further evaluation.  She states she has not had any ultrasound of thyroid done recently.\par \par I have reviewed above at length. I answered all the questions. Patient verbalized understanding\par Thank you very much for allowing me to participate in your patient's care. Please feel free to call me for any questions.\par \par Any significant change in clinical status she will call 911.\par

## 2021-11-29 NOTE — PHYSICAL EXAM
[General Appearance - Well Developed] : well developed [Normal Appearance] : normal appearance [Well Groomed] : well groomed [General Appearance - Well Nourished] : well nourished [No Deformities] : no deformities [General Appearance - In No Acute Distress] : no acute distress [Normal Conjunctiva] : the conjunctiva exhibited no abnormalities [Eyelids - No Xanthelasma] : the eyelids demonstrated no xanthelasmas [Normal Jugular Venous A Waves Present] : normal jugular venous A waves present [Normal Jugular Venous V Waves Present] : normal jugular venous V waves present [No Jugular Venous Jacinto A Waves] : no jugular venous jacinto A waves [Respiration, Rhythm And Depth] : normal respiratory rhythm and effort [Exaggerated Use Of Accessory Muscles For Inspiration] : no accessory muscle use [Auscultation Breath Sounds / Voice Sounds] : lungs were clear to auscultation bilaterally [Heart Rate And Rhythm] : heart rate and rhythm were normal [Heart Sounds] : normal S1 and S2 [Murmurs] : no murmurs present [Abnormal Walk] : normal gait [Nail Clubbing] : no clubbing of the fingernails [Cyanosis, Localized] : no localized cyanosis [] : no rash [Oriented To Time, Place, And Person] : oriented to person, place, and time [Affect] : the affect was normal [Mood] : the mood was normal [No Anxiety] : not feeling anxious

## 2021-12-13 ENCOUNTER — APPOINTMENT (OUTPATIENT)
Dept: CARDIOLOGY | Facility: CLINIC | Age: 81
End: 2021-12-13

## 2021-12-14 ENCOUNTER — APPOINTMENT (OUTPATIENT)
Dept: CARDIOLOGY | Facility: CLINIC | Age: 81
End: 2021-12-14
Payer: MEDICARE

## 2021-12-14 PROCEDURE — 93306 TTE W/DOPPLER COMPLETE: CPT

## 2021-12-14 PROCEDURE — 93880 EXTRACRANIAL BILAT STUDY: CPT

## 2021-12-20 ENCOUNTER — APPOINTMENT (OUTPATIENT)
Dept: CARDIOLOGY | Facility: CLINIC | Age: 81
End: 2021-12-20
Payer: MEDICARE

## 2021-12-20 VITALS
SYSTOLIC BLOOD PRESSURE: 178 MMHG | WEIGHT: 128 LBS | BODY MASS INDEX: 21.33 KG/M2 | OXYGEN SATURATION: 99 % | HEART RATE: 70 BPM | DIASTOLIC BLOOD PRESSURE: 80 MMHG | RESPIRATION RATE: 15 BRPM | HEIGHT: 65 IN | TEMPERATURE: 96.9 F

## 2021-12-20 PROCEDURE — 99214 OFFICE O/P EST MOD 30 MIN: CPT

## 2021-12-20 RX ORDER — VENLAFAXINE HYDROCHLORIDE 75 MG/1
75 CAPSULE, EXTENDED RELEASE ORAL
Qty: 90 | Refills: 0 | Status: DISCONTINUED | COMMUNITY
Start: 2017-06-19 | End: 2021-12-20

## 2021-12-20 NOTE — REASON FOR VISIT
[Structural Heart and Valve Disease] : structural heart and valve disease [Hyperlipidemia] : hyperlipidemia [Hypertension] : hypertension [Other: ____] : [unfilled] [FreeTextEntry3] : Dr. Gan [FreeTextEntry1] : 81-year-old female comes in for follow-up consultation.  She could not tolerate amlodipine/benazepril 5/20 milligrams during her travel to [place].  She is here also to review her echocardiogram and carotid Doppler study\par She went back to lisinopril 20 mg and metoprolol 50 mg.\par She feels better.\par Has not been able to do regular exercises\par Denies any increased salt or alcohol intake\par Has lost weight\par No chest pain.  No significant shortness of breath PND orthopnea palpitation dizziness lightheadedness near syncopal or syncopal event.\par No claudication.\par States compliance with medication\par Stable sleep pattern\par \par

## 2021-12-20 NOTE — DISCUSSION/SUMMARY
[FreeTextEntry1] : 81-year-old female comes in for followup consultation with above medical history active medical problems as noted below.\par #1  Moderate carotid atherosclerosis.  Will restart aspirin 81 mg once blood pressure is stable.  Also will have to discuss lipid management considering progression of atherosclerotic vascular disease.\par #2 nonischemic cardiomyopathy. Preserved systolic function. Coronary angiography showed no significant obstructive disease. Continue lifestyle and risk factor modifications.  Follow-up echocardiogram.\par #3 hypertensive heart disease Without any congestive heart failure.  Still continued moderate elevation.\par No CKD.\par Because of intolerance to 5/20 milligrams amlodipine/benazepril recommended to try 5/10 milligrams amlodipine/benazepril with gradually increasing dosing to achieve the goal of less than 130/80 at least less than 140/90.\par She will take low-salt diet.\par She will increase her exercises.\par High risk symptoms she will call 911.\par #3 hyperlipidemia.  She stopped taking statin therapy.  Will have to discuss further again regarding statin therapy specifically in presence of progressive atherosclerosis of carotids.  She could not tolerate higher dose of statin in the past.  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 No signs of left to right heart failure. Continue blood pressure, heart rate control. No significant worsening noted.  Echocardiogram relatively stable with continued left atrial enlargement and nonrheumatic mild mitral insufficiency.\par #5 because of strong family history of thyroid cancer with multiple family members including her son and siblings recommended to discuss with you or see ENT specialist for further evaluation.  She states she has not had any ultrasound of thyroid done recently.\par \par Counseling regarding low saturated fat, salt and carbohydrate intake was reviewed. Active lifestyle and regular. Exercise along with weight management is advised.\par I have reviewed above at length. I answered all the questions. Patient verbalized understanding\par Thank you very much for allowing me to participate in your patient's care. Please feel free to call me for any questions.\par \par

## 2021-12-20 NOTE — ASSESSMENT
[FreeTextEntry1] : \par past tests for reference:\par \par Invasive coronary angiography was reviewed. Showed normal coronaries. 2017\par \par cardiac cath 2013- no obstructive CAD\par Echocardiogram 2/11/2016.  LV ejection fraction 60%.  Borderline left atrial enlargement.  Fibrocalcific aortic valve with normal opening.  Trace mitral and tricuspid regurgitation.  Overall no significant change is noted.\par \par Echocardiogram dated July 21, 2017 and ejection fraction 60-65%. Left atrial size 4.1. Sigmoid septum. Mild diastolic LV dysfunction. Pulmonary artery systolic pressure 33 mm mercury.\par \par Stress echocardiogram October 10, 2017 . 4 minutes and 13 seconds. 5 METs. Abnormal EKG changes suggestive of ischemia. Normal blood pressure and heart rate response. No chest pain.\par Echocardiogram portion of the test showed hypokinesis of mid inferolateral region, suggestive of ischemia\par \par EKG. July 9, 2019 ., dictation, hypertension, for interpretation, normal sinus rhythm.\par Labs , triglycerides 63, sodium 142, potassium 4.2, creatinine 1.01 LFT normal. Total cholesterol 180 TSH 1.13 HDL 50\par \par Reviewed on August 7, 2019.\par Echocardiogram 8/7/19 ventricle ejection fraction 60% minimal mitral insufficiency Moderate left atrial enlargement, mild diastolic dysfunction, pulmonary pressures of 32 mm, mild tricuspid insufficiency\par \par Reviewed on November 24, 2021\par Labs October 17, 2021 sodium 137 potassium 4.1 creatinine 0.7.  Carotid Doppler study January 9, 2020.\par Bilateral mild carotid atherosclerotic vascular disease.\par \par Reviewed on December 2021.\par Carotid Doppler study, echocardiogram reviewed.

## 2021-12-20 NOTE — CARDIOLOGY SUMMARY
[de-identified] : December 14, 2021.  LVEF 60% mild mitral and minimal aortic regurgitation severely dilated left atrium [de-identified] : Bilateral carotid Doppler study.  December 14, 2021.  R ICA less than 50% left ICA 50 to 69%.

## 2022-01-31 ENCOUNTER — APPOINTMENT (OUTPATIENT)
Dept: CARDIOLOGY | Facility: CLINIC | Age: 82
End: 2022-01-31
Payer: MEDICARE

## 2022-01-31 ENCOUNTER — NON-APPOINTMENT (OUTPATIENT)
Age: 82
End: 2022-01-31

## 2022-01-31 VITALS
HEART RATE: 69 BPM | SYSTOLIC BLOOD PRESSURE: 128 MMHG | HEIGHT: 65 IN | DIASTOLIC BLOOD PRESSURE: 62 MMHG | BODY MASS INDEX: 21.66 KG/M2 | OXYGEN SATURATION: 97 % | WEIGHT: 130 LBS

## 2022-01-31 PROCEDURE — 99214 OFFICE O/P EST MOD 30 MIN: CPT

## 2022-01-31 PROCEDURE — 93000 ELECTROCARDIOGRAM COMPLETE: CPT

## 2022-01-31 NOTE — PHYSICAL EXAM
[General Appearance - Well Developed] : well developed [Normal Appearance] : normal appearance [Well Groomed] : well groomed [General Appearance - Well Nourished] : well nourished [No Deformities] : no deformities [General Appearance - In No Acute Distress] : no acute distress [] : no respiratory distress [Respiration, Rhythm And Depth] : normal respiratory rhythm and effort [Exaggerated Use Of Accessory Muscles For Inspiration] : no accessory muscle use [Auscultation Breath Sounds / Voice Sounds] : lungs were clear to auscultation bilaterally [Heart Rate And Rhythm] : heart rate and rhythm were normal [Heart Sounds] : normal S1 and S2 [Murmurs] : no murmurs present [Abnormal Walk] : normal gait [Nail Clubbing] : no clubbing of the fingernails [Cyanosis, Localized] : no localized cyanosis [Oriented To Time, Place, And Person] : oriented to person, place, and time [Affect] : the affect was normal [Mood] : the mood was normal [No Anxiety] : not feeling anxious

## 2022-01-31 NOTE — REASON FOR VISIT
[Structural Heart and Valve Disease] : structural heart and valve disease [Hyperlipidemia] : hyperlipidemia [Hypertension] : hypertension [Other: ____] : [unfilled] [FreeTextEntry3] : Dr. Gan [FreeTextEntry1] : 81-year-old female comes in for follow-up consultation.  She could not tolerate amlodipine/benazepril 5/20 milligrams during her travel to [place].  She is here also to review her echocardiogram and carotid Doppler study\par She went back to lisinopril 20 mg and metoprolol 50 mg.\par She feels better.\par Has not been able to do regular exercises\par Denies any increased salt or alcohol intake\par Has lost weight\par No chest pain.  No significant shortness of breath PND orthopnea palpitation dizziness lightheadedness near syncopal or syncopal event.\par No claudication.\par States compliance with medication\par Stable sleep pattern\par \par

## 2022-01-31 NOTE — DISCUSSION/SUMMARY
[FreeTextEntry1] : 82-year-old female comes in for followup consultation with above medical history active medical problems as noted below.\par #1  Moderate carotid atherosclerosis.  Will restart aspirin 81 mg once blood pressure is stable.  Also will have to discuss lipid management considering progression of atherosclerotic vascular disease.\par #2 nonischemic cardiomyopathy. Preserved systolic function. Coronary angiography showed no significant obstructive disease. Continue lifestyle and risk factor modifications.  Follow-up echocardiogram.\par #3 hypertensive heart disease Without any congestive heart failure.  Much better controlled.  Tolerating amlodipine/benazepril 5/10 milligrams daily.  We will continue present dosage to achieve the goal of less than 130/80 at least less than 140/90.\par She will take low-salt diet.\par She will increase her exercises.\par High risk symptoms she will call 911.\par #3 hyperlipidemia.  She stopped taking statin therapy.  Could not tolerate statin therapy.  Did not want to pursue evaluation management understands risk benefits alternatives.  Repeat labs ordered.  Based on that we will discuss further issues with ability to use Repatha/Praluent type of medications.\par Continue low saturated fat, carbohydrate intake.\par #4 history of non rheumatic mitral insufficiency. No signs of left to right heart failure. Continue blood pressure, heart rate control. No significant worsening noted.  Echocardiogram relatively stable with continued left atrial enlargement and nonrheumatic mild mitral insufficiency.\par #5 because of strong family history of thyroid cancer with multiple family members including her son and siblings recommended to discuss with you or see ENT specialist for further evaluation.  She states she has not had any ultrasound of thyroid done recently.\par #6 hyperglycemia.  Hemoglobin A1c with next labs.\par \par Counseling regarding low saturated fat, salt and carbohydrate intake was reviewed. Active lifestyle and regular. Exercise along with weight management is advised.\par I have reviewed above at length. I answered all the questions. Patient verbalized understanding\par Thank you very much for allowing me to participate in your patient's care. Please feel free to call me for any questions.\par \par

## 2022-01-31 NOTE — ASSESSMENT
[FreeTextEntry1] : past tests for reference:\par \par Invasive coronary angiography was reviewed. Showed normal coronaries. 2017\par \par cardiac cath 2013- no obstructive CAD\par Echocardiogram 2/11/2016.  LV ejection fraction 60%.  Borderline left atrial enlargement.  Fibrocalcific aortic valve with normal opening.  Trace mitral and tricuspid regurgitation.  Overall no significant change is noted.\par \par Echocardiogram dated July 21, 2017 and ejection fraction 60-65%. Left atrial size 4.1. Sigmoid septum. Mild diastolic LV dysfunction. Pulmonary artery systolic pressure 33 mm mercury.\par \par Stress echocardiogram October 10, 2017 . 4 minutes and 13 seconds. 5 METs. Abnormal EKG changes suggestive of ischemia. Normal blood pressure and heart rate response. No chest pain.\par Echocardiogram portion of the test showed hypokinesis of mid inferolateral region, suggestive of ischemia\par \par EKG. July 9, 2019 ., dictation, hypertension, for interpretation, normal sinus rhythm.\par Labs , triglycerides 63, sodium 142, potassium 4.2, creatinine 1.01 LFT normal. Total cholesterol 180 TSH 1.13 HDL 50\par \par Reviewed on August 7, 2019.\par Echocardiogram 8/7/19 ventricle ejection fraction 60% minimal mitral insufficiency Moderate left atrial enlargement, mild diastolic dysfunction, pulmonary pressures of 32 mm, mild tricuspid insufficiency\par \par Reviewed on November 24, 2021\par Labs October 17, 2021 sodium 137 potassium 4.1 creatinine 0.7.  Carotid Doppler study January 9, 2020.\par Bilateral mild carotid atherosclerotic vascular disease.\par \par Reviewed on December 2021.\par Carotid Doppler study, echocardiogram reviewed.

## 2022-05-02 ENCOUNTER — APPOINTMENT (OUTPATIENT)
Dept: CARDIOLOGY | Facility: CLINIC | Age: 82
End: 2022-05-02
Payer: MEDICARE

## 2022-05-02 VITALS
DIASTOLIC BLOOD PRESSURE: 74 MMHG | BODY MASS INDEX: 21.49 KG/M2 | TEMPERATURE: 97.3 F | OXYGEN SATURATION: 98 % | HEART RATE: 64 BPM | HEIGHT: 65 IN | WEIGHT: 129 LBS | SYSTOLIC BLOOD PRESSURE: 128 MMHG

## 2022-05-02 PROCEDURE — 99214 OFFICE O/P EST MOD 30 MIN: CPT

## 2022-05-02 NOTE — DISCUSSION/SUMMARY
[FreeTextEntry1] : VÍCTOR GIL  is a 82 year F  who presents today May 02, 2022 with the above history and the following active issues. \par \par Moderate carotid atherosclerosis. Patient is uncertain what statin PCP has given her. Our office will reach out for clarification. ASA 81mg QD recommended. \par \par Nonischemic cardiomyopathy. Preserved systolic function. Coronary angiography showed no significant obstructive disease. Continue lifestyle and risk factor modifications.  Follow-up echocardiogram.\par \par Hypertensive heart disease. Blood pressure well controlled. Continue Metoprolol 100mg and Amlodipine 5 mg QD. \par Low-salt diet. Increase her exercises.\par High risk symptoms she will call 911.\par \par Hyperlipidemia.  Clarification regarding statin therapy needed. \par Low cholesterol diet. Follow-up fasting lipid panel results. \par Continue low saturated fat, carbohydrate intake.\par \par History of non rheumatic mitral insufficiency. No signs of left to right heart failure. Continue blood pressure, heart rate control. No significant worsening noted.  Echocardiogram relatively stable with continued left atrial enlargement and nonrheumatic mild mitral insufficiency.\par \par Continue follow-up with ENT regarding thyroid nodule. \par \par Red flag symptoms which would warrant sooner emergent evaluation reviewed with the patient. \par Questions and concerns were addressed and answered. \par \par Sincerely,\par \par Nani Cuenca PA-C\par Patients history, testing and plan reviewed with supervising MD: Dr. Funmilayo Goldstein

## 2022-05-02 NOTE — REASON FOR VISIT
[Structural Heart and Valve Disease] : structural heart and valve disease [Hyperlipidemia] : hyperlipidemia [Hypertension] : hypertension [Other: ____] : [unfilled] [FreeTextEntry3] : Dr. Gan [FreeTextEntry1] : VÍCTOR GIL  is a 82 year F  who presents today May 02, 2022 in clinical follow-up. \par Overall she has been feeling well. There has been no recent illness or hospital stay. Medications have remained unchanged. Asymptomatic from cardiovascular and arrhythmia standpoint. \par Taking Amlodipine 5 mg QD and Metoprolol 100mg QD.  \par Blood pressure well controlled on my assessment 128/68.\par Less active since the pandemic. Although no new exertional complaints.\par \par Today she denies chest pain, pressure, unusual shortness of breath, lightheadedness, dizziness, near syncope or syncope. \par \par \par

## 2022-05-02 NOTE — CARDIOLOGY SUMMARY
[de-identified] : January 31, 2022.  Sinus bradycardia. [de-identified] : December 14, 2021.  LVEF 60% mild mitral and minimal aortic regurgitation severely dilated left atrium [de-identified] : Cardiac cath 2017 - non-obstructive disease [de-identified] : Bilateral carotid Doppler study.  December 14, 2021.  R ICA less than 50% left ICA 50 to 69%.

## 2022-11-07 ENCOUNTER — APPOINTMENT (OUTPATIENT)
Dept: CARDIOLOGY | Facility: CLINIC | Age: 82
End: 2022-11-07

## 2022-11-07 VITALS
HEIGHT: 65 IN | OXYGEN SATURATION: 97 % | BODY MASS INDEX: 22.66 KG/M2 | DIASTOLIC BLOOD PRESSURE: 60 MMHG | SYSTOLIC BLOOD PRESSURE: 122 MMHG | HEART RATE: 68 BPM | WEIGHT: 136 LBS

## 2022-11-07 PROCEDURE — 99214 OFFICE O/P EST MOD 30 MIN: CPT

## 2022-11-07 RX ORDER — ALENDRONATE SODIUM 70 MG/1
70 TABLET ORAL
Refills: 0 | Status: DISCONTINUED | COMMUNITY
End: 2022-11-07

## 2022-11-07 NOTE — REASON FOR VISIT
[Structural Heart and Valve Disease] : structural heart and valve disease [Hyperlipidemia] : hyperlipidemia [Hypertension] : hypertension [Other: ____] : [unfilled] [FreeTextEntry3] : Dr. Gan [FreeTextEntry1] : VÍCTOR GIL  is a 82 year F  who presents today Nov 07, 2022 in clinical follow-up.\par Overall she has been feeling well. There has been no recent illness or hospital stay. Medications have remained unchanged. Asymptomatic from cardiovascular and arrhythmia standpoint. \par Taking Amlodipine 5 mg QD and Metoprolol 100mg QD.  \par Blood pressure well controlled on my assessment 120/60.\par Main complaint is left ankle pain from prior injury and worsening right knee pain. Considering surgery in the future. Patient unclear if she is taking statin and has not been taking ASA due to minor bruising. \par \par Today she denies chest pain, pressure, unusual shortness of breath, lightheadedness, dizziness, near syncope or syncope. 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 Class I. Functional status.\par NSVT EP no recurrence. No syncopal episode.\par Hypertension. Recent changes in medication. \par Hyperlipidemia. On cholesterol-lowering agent be a much better control.\par Fibromyalgia mild-mod active controlled\par Thyroid nodules

## 2022-11-07 NOTE — CARDIOLOGY SUMMARY
[de-identified] : January 31, 2022.  Sinus bradycardia. [de-identified] : December 14, 2021.  LVEF 60% mild mitral and minimal aortic regurgitation severely dilated left atrium [de-identified] : Cardiac cath 2017 - non-obstructive disease [de-identified] : Bilateral carotid Doppler study.  December 14, 2021.  R ICA less than 50% left ICA 50 to 69%.

## 2022-11-07 NOTE — DISCUSSION/SUMMARY
[FreeTextEntry1] : VÍCTOR GIL  is a 82 year F  who presents today Nov 07, 2022 with the above history and the following active issues. \par \par Moderate carotid atherosclerosis. Follow-up carotid  US recommended. \par Requesting clarification on statin.  ASA 81mg QD recommended. \par \par Nonischemic cardiomyopathy. Preserved systolic function. Coronary angiography showed no significant obstructive disease. Continue lifestyle and risk factor modifications.  Follow-up echocardiogram.\par \par Hypertensive heart disease. Blood pressure well controlled. Continue Metoprolol 100mg and Amlodipine 5 mg QD. \par Low-salt diet. Increase her exercises.\par High risk symptoms she will call 911.\par \par Hyperlipidemia.  Clarification regarding statin therapy needed. \par Low cholesterol diet. Follow-up fasting lipid panel results. \par Continue low saturated fat, carbohydrate intake.\par \par History of non rheumatic mitral insufficiency. No signs of left to right heart failure. Continue blood pressure, heart rate control. No significant worsening noted.  Echocardiogram relatively stable with continued left atrial enlargement and nonrheumatic mild mitral insufficiency.\par \par Continue follow-up with ENT regarding thyroid nodule. \par \par Red flag symptoms which would warrant sooner emergent evaluation reviewed with the patient. \par Questions and concerns were addressed and answered. \par \par Sincerely,\par \par Nani Cuenca PA-C\par Patients history, testing and plan reviewed with supervising MD: Dr. Funmilayo Goldstein

## 2022-12-12 ENCOUNTER — APPOINTMENT (OUTPATIENT)
Dept: CARDIOLOGY | Facility: CLINIC | Age: 82
End: 2022-12-12

## 2022-12-12 VITALS
TEMPERATURE: 97.2 F | WEIGHT: 132 LBS | BODY MASS INDEX: 21.99 KG/M2 | SYSTOLIC BLOOD PRESSURE: 140 MMHG | OXYGEN SATURATION: 98 % | HEIGHT: 65 IN | DIASTOLIC BLOOD PRESSURE: 70 MMHG | HEART RATE: 65 BPM

## 2022-12-12 PROCEDURE — 99214 OFFICE O/P EST MOD 30 MIN: CPT

## 2022-12-12 PROCEDURE — 93880 EXTRACRANIAL BILAT STUDY: CPT

## 2022-12-12 PROCEDURE — 93306 TTE W/DOPPLER COMPLETE: CPT

## 2022-12-12 PROCEDURE — 93979 VASCULAR STUDY: CPT

## 2022-12-12 RX ORDER — ROSUVASTATIN CALCIUM 5 MG/1
5 TABLET, FILM COATED ORAL DAILY
Refills: 0 | Status: ACTIVE | COMMUNITY

## 2022-12-12 NOTE — REASON FOR VISIT
[Structural Heart and Valve Disease] : structural heart and valve disease [Hyperlipidemia] : hyperlipidemia [Hypertension] : hypertension [Other: ____] : [unfilled] [FreeTextEntry3] : Dr. Gan [FreeTextEntry1] : VÍCTOR GIL  is a 82 year F  who presents today Dec 12, 2022 for echo, carotid US, abd US and follow-up to review. \par Overall she has been feeling well. There has been no recent illness or hospital stay. Medications have remained unchanged. Asymptomatic from cardiovascular and arrhythmia standpoint. \par \par Main complaint is left ankle pain from prior injury and worsening right knee pain. Considering surgery in the future. Patient unclear if she is taking statin and has not been taking ASA due to minor bruising. \par \par Today she denies chest pain, pressure, unusual shortness of breath, lightheadedness, dizziness, near syncope or syncope. 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 Class I. Functional status.\par NSVT EP no recurrence. No syncopal episode.\par Hypertension. Recent changes in medication. \par Hyperlipidemia. On cholesterol-lowering agent be a much better control.\par Fibromyalgia mild-mod active controlled\par Thyroid nodules

## 2022-12-12 NOTE — DISCUSSION/SUMMARY
[FreeTextEntry1] : VÍCTOR GIL  is a 82 year F  who presents today Dec 12, 2022 with the above history and the following active issues. \par \par Moderate carotid atherosclerosis.  ASA 81mg QD recommended. \par \par Nonischemic cardiomyopathy. Preserved systolic function. Coronary angiography showed no significant obstructive disease. Continue lifestyle and risk factor modifications.  Echo reviewed from earlier today.\par \par Hypertensive heart disease. Blood pressure elevated on my examination. Recommend increasing Amlodipine/Benazepril to 5/20mg. Continue Metoprolol 100mg. \par Low-salt diet. Increase her exercises.\par High risk symptoms she will call 911.\par Recently purchased a home BP monitor which I asked her to bring to the office to check for accuracy.\par I requested she call me in one month to review home BP readings. BP goal <130/70.\par \par Hyperlipidemia.  Clarification regarding statin therapy needed. \par Low cholesterol diet. Follow-up fasting lipid panel results. \par Continue low saturated fat, carbohydrate intake.\par \par History of non rheumatic mitral insufficiency. No signs of left to right heart failure. Recommend improved blood pressure control.  Echocardiogram relatively stable with continued left atrial enlargement and nonrheumatic mild mitral insufficiency.\par \par Continue follow-up with ENT regarding thyroid nodule. \par \par Red flag symptoms which would warrant sooner emergent evaluation reviewed with the patient. \par Questions and concerns were addressed and answered. \par \par Sincerely,\par \par Nani Cuenca PA-C\par Patients history, testing and plan reviewed with supervising MD: Dr. Funmilayo Goldstein

## 2022-12-12 NOTE — CARDIOLOGY SUMMARY
[de-identified] : January 31, 2022.  Sinus bradycardia. [de-identified] : Cardiac cath 2017 - non-obstructive disease [de-identified] : December 14, 2021.  LVEF 60% mild mitral and minimal aortic regurgitation severely dilated left atrium\par \par December 12, 2022 EF 55-60%, dilated left atrium, Mild MR,  [de-identified] : Bilateral carotid Doppler study.  December 14, 2021.  R ICA less than 50% left ICA 50 to 69%.

## 2023-02-13 ENCOUNTER — OFFICE (OUTPATIENT)
Dept: URBAN - METROPOLITAN AREA CLINIC 8 | Facility: CLINIC | Age: 83
Setting detail: OPHTHALMOLOGY
End: 2023-02-13
Payer: MEDICARE

## 2023-02-13 DIAGNOSIS — H35.3131: ICD-10-CM

## 2023-02-13 DIAGNOSIS — H35.033: ICD-10-CM

## 2023-02-13 DIAGNOSIS — H11.153: ICD-10-CM

## 2023-02-13 DIAGNOSIS — Z96.1: ICD-10-CM

## 2023-02-13 DIAGNOSIS — H33.311: ICD-10-CM

## 2023-02-13 DIAGNOSIS — H26.493: ICD-10-CM

## 2023-02-13 DIAGNOSIS — H16.223: ICD-10-CM

## 2023-02-13 DIAGNOSIS — H43.813: ICD-10-CM

## 2023-02-13 PROCEDURE — 92014 COMPRE OPH EXAM EST PT 1/>: CPT | Performed by: OPHTHALMOLOGY

## 2023-02-13 ASSESSMENT — REFRACTION_AUTOREFRACTION
OS_SPHERE: -0.50
OD_CYLINDER: -0.50
OS_CYLINDER: -0.75
OD_SPHERE: -0.50
OD_AXIS: 079
OS_AXIS: 070

## 2023-02-13 ASSESSMENT — REFRACTION_MANIFEST
OS_ADD: +2.25
OD_VA2: 20/25(J1)
OD_AXIS: 080
OS_VA2: 20/25(J1)
OS_VA1: 20/30+
OS_AXIS: 070
OD_SPHERE: PLANO
OU_VA: 20/20-
OS_CYLINDER: -0.75
OU_VA: 20/20-1
OD_SPHERE: -0.25
OS_SPHERE: -0.50
OS_AXIS: 070
OS_CYLINDER: -0.75
OD_CYLINDER: -0.25
OD_AXIS: 080
OD_VA1: 20/25
OD_ADD: +2.25
OS_VA2: 20/40(J3)
OS_SPHERE: -0.50
OD_CYLINDER: -0.50
OD_VA1: 20/20-1
OD_VA2: 20/40(J3)
OS_VA1: 20/30-1

## 2023-02-13 ASSESSMENT — KERATOMETRY
OS_AXISANGLE_DEGREES: 090
OD_K1POWER_DIOPTERS: 45.00
OS_K2POWER_DIOPTERS: 45.25
METHOD_AUTO_MANUAL: AUTO
OS_K1POWER_DIOPTERS: 45.25
OD_AXISANGLE_DEGREES: 090
OD_K2POWER_DIOPTERS: 45.00

## 2023-02-13 ASSESSMENT — REFRACTION_CURRENTRX
OD_ADD: +1.75
OD_VPRISM_DIRECTION: SV
OS_VPRISM_DIRECTION: SV
OS_ADD: +1.75
OD_OVR_VA: 20/
OS_OVR_VA: 20/

## 2023-02-13 ASSESSMENT — SPHEQUIV_DERIVED
OS_SPHEQUIV: -0.875
OS_SPHEQUIV: -0.875
OD_SPHEQUIV: -0.75
OS_SPHEQUIV: -0.875
OD_SPHEQUIV: -0.375

## 2023-02-13 ASSESSMENT — CONFRONTATIONAL VISUAL FIELD TEST (CVF)
OS_FINDINGS: FULL
OD_FINDINGS: FULL

## 2023-02-13 ASSESSMENT — AXIALLENGTH_DERIVED
OD_AL: 23.336
OS_AL: 23.2938
OD_AL: 23.1939
OS_AL: 23.2938
OS_AL: 23.2938

## 2023-02-13 ASSESSMENT — VISUAL ACUITY
OS_BCVA: 20/25
OD_BCVA: 20/50

## 2023-03-16 PROBLEM — H25.11: Status: ACTIVE | Noted: 2023-03-16

## 2023-03-16 PROBLEM — H25.13: Status: ACTIVE | Noted: 2023-03-16

## 2023-06-12 ENCOUNTER — APPOINTMENT (OUTPATIENT)
Dept: CARDIOLOGY | Facility: CLINIC | Age: 83
End: 2023-06-12
Payer: MEDICARE

## 2023-06-12 VITALS
OXYGEN SATURATION: 98 % | DIASTOLIC BLOOD PRESSURE: 48 MMHG | SYSTOLIC BLOOD PRESSURE: 122 MMHG | WEIGHT: 128 LBS | BODY MASS INDEX: 21.33 KG/M2 | HEIGHT: 65 IN | HEART RATE: 66 BPM

## 2023-06-12 PROCEDURE — 99214 OFFICE O/P EST MOD 30 MIN: CPT

## 2023-06-12 RX ORDER — VENLAFAXINE 75 MG/1
75 TABLET ORAL DAILY
Refills: 0 | Status: DISCONTINUED | COMMUNITY
Start: 2021-12-20 | End: 2023-06-12

## 2023-06-12 NOTE — DISCUSSION/SUMMARY
[FreeTextEntry1] : VÍCTOR GIL  is a 83 year F  who presents today Jun 12, 2023 with the above history and the following active issues. \par \par Recent admission for dehydration. Now feeling much better. Recommend increasing hydration. Monitor BP at home. Bring home monitor to be assessed for accuracy in 2 weeks. If blood pressure remains elevated medication adjustment will be recommended. \par \par Moderate carotid atherosclerosis.  ASA 81mg QD recommended. \par \par Nonischemic cardiomyopathy. Preserved systolic function. Coronary angiography showed no significant obstructive disease. Continue lifestyle and risk factor modifications.  \par \par Hypertensive heart disease. Continue Amlodipine/Benazepril to 5/20mg. Continue Metoprolol 100mg. \par Low-salt diet. Increase her exercises.\par High risk symptoms she will call 911.\par BP goal <130/70.\par Reassessment in 2 weeks as per above. \par \par Hyperlipidemia.  Clarification regarding statin therapy needed. \par Low cholesterol diet. Follow-up fasting lipid panel results. \par Continue low saturated fat, carbohydrate intake.\par \par History of non rheumatic mitral insufficiency. No signs of left to right heart failure. Recommend improved blood pressure control.  Echocardiogram relatively stable with continued left atrial enlargement and nonrheumatic mild mitral insufficiency.\par \par Continue follow-up with ENT regarding thyroid nodule. \par \par Red flag symptoms which would warrant sooner emergent evaluation reviewed with the patient. \par Questions and concerns were addressed and answered. \par \par Sincerely,\par \par Nani Cuenca PA-C\par Patients history, testing and plan reviewed with supervising MD: Dr. Funmilayo Goldstein

## 2023-06-12 NOTE — PHYSICAL EXAM
[General Appearance - Well Developed] : well developed [Normal Appearance] : normal appearance [Well Groomed] : well groomed [General Appearance - Well Nourished] : well nourished [No Deformities] : no deformities [General Appearance - In No Acute Distress] : no acute distress [] : no respiratory distress [Respiration, Rhythm And Depth] : normal respiratory rhythm and effort [Exaggerated Use Of Accessory Muscles For Inspiration] : no accessory muscle use [Auscultation Breath Sounds / Voice Sounds] : lungs were clear to auscultation bilaterally [Heart Rate And Rhythm] : heart rate and rhythm were normal [Heart Sounds] : normal S1 and S2 [Murmurs] : no murmurs present [Nail Clubbing] : no clubbing of the fingernails [Abnormal Walk] : normal gait [Cyanosis, Localized] : no localized cyanosis [Oriented To Time, Place, And Person] : oriented to person, place, and time [Affect] : the affect was normal [Mood] : the mood was normal [No Anxiety] : not feeling anxious

## 2023-06-12 NOTE — REASON FOR VISIT
[Structural Heart and Valve Disease] : structural heart and valve disease [Hyperlipidemia] : hyperlipidemia [Hypertension] : hypertension [Other: ____] : [unfilled] [FreeTextEntry3] : Dr. Gan [FreeTextEntry1] : VÍCTOR GIL  is a 83 year F  who presents today Jun 12, 2023 in clinical follow-up. \par Evaluated at her PCP after not feeling well for several day. Recommended ER evaluation. Evaluated and treated at [facility]. Dx with dehydration. Blood pressure intermittently elevated while hospitalized. On my assessment today 160/60 and then 140/60. Since discharge she is feeling better. \par \par Today she denies chest pain, pressure, unusual shortness of breath, lightheadedness, dizziness, near syncope or syncope. 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 Class I. Functional status.\par NSVT EP no recurrence. No syncopal episode.\par Hypertension. Recent changes in medication. \par Hyperlipidemia. On cholesterol-lowering agent be a much better control.\par Fibromyalgia mild-mod active controlled\par Thyroid nodules

## 2023-06-12 NOTE — CARDIOLOGY SUMMARY
[de-identified] : January 31, 2022.  Sinus bradycardia. [de-identified] : December 14, 2021.  LVEF 60% mild mitral and minimal aortic regurgitation severely dilated left atrium\par \par December 12, 2022 EF 55-60%, dilated left atrium, Mild MR,  [de-identified] : Cardiac cath 2017 - non-obstructive disease [de-identified] : Bilateral carotid Doppler study.  December 14, 2021.  R ICA less than 50% left ICA 50 to 69%.

## 2023-06-15 ENCOUNTER — NON-APPOINTMENT (OUTPATIENT)
Age: 83
End: 2023-06-15

## 2023-06-15 RX ORDER — AMLODIPINE BESYLATE AND BENAZEPRIL HYDROCHLORIDE 5; 10 MG/1; MG/1
5-10 CAPSULE ORAL
Qty: 90 | Refills: 0 | Status: DISCONTINUED | COMMUNITY
Start: 2023-06-06

## 2023-06-16 ENCOUNTER — APPOINTMENT (OUTPATIENT)
Dept: CARDIOLOGY | Facility: CLINIC | Age: 83
End: 2023-06-16
Payer: MEDICARE

## 2023-06-16 VITALS
OXYGEN SATURATION: 97 % | DIASTOLIC BLOOD PRESSURE: 62 MMHG | BODY MASS INDEX: 21.66 KG/M2 | SYSTOLIC BLOOD PRESSURE: 134 MMHG | WEIGHT: 130 LBS | HEIGHT: 65 IN | HEART RATE: 71 BPM

## 2023-06-16 PROCEDURE — 99214 OFFICE O/P EST MOD 30 MIN: CPT

## 2023-06-16 RX ORDER — DESVENLAFAXINE 50 MG/1
50 TABLET, EXTENDED RELEASE ORAL DAILY
Refills: 0 | Status: ACTIVE | COMMUNITY

## 2023-06-16 RX ORDER — DICYCLOMINE HYDROCHLORIDE 10 MG/1
10 CAPSULE ORAL AS DIRECTED
Refills: 0 | Status: ACTIVE | COMMUNITY

## 2023-06-16 RX ORDER — OMEPRAZOLE 40 MG/1
40 CAPSULE, DELAYED RELEASE ORAL
Refills: 0 | Status: ACTIVE | COMMUNITY

## 2023-06-16 NOTE — CARDIOLOGY SUMMARY
[de-identified] : January 31, 2022.  Sinus bradycardia. [de-identified] : December 14, 2021.  LVEF 60% mild mitral and minimal aortic regurgitation severely dilated left atrium\par \par December 12, 2022 EF 55-60%, dilated left atrium, Mild MR,  [de-identified] : Cardiac cath 2017 - non-obstructive disease [de-identified] : Bilateral carotid Doppler study.  December 14, 2021.  R ICA less than 50% left ICA 50 to 69%.

## 2023-06-16 NOTE — REASON FOR VISIT
[Structural Heart and Valve Disease] : structural heart and valve disease [Hyperlipidemia] : hyperlipidemia [Hypertension] : hypertension [Other: ____] : [unfilled] [FreeTextEntry3] : Dr. Gan [FreeTextEntry1] : VÍCTOR GIL  is a 83 year F  who presents today June 16, 2023 for urgent evaluation.\par Blood pressure readings very high today.  Did not feel well.\par Fatigue and tiredness.\par No chest pain.  Mild headache.\par \par Today she denies chest pain, pressure, unusual shortness of breath, lightheadedness, dizziness, near syncope or syncope. 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 Class I. Functional status.\par NSVT EP no recurrence. No syncopal episode.\par Hypertension. Recent changes in medication. \par Hyperlipidemia. On cholesterol-lowering agent be a much better control.\par Fibromyalgia mild-mod active controlled\par Thyroid nodules

## 2023-06-16 NOTE — DISCUSSION/SUMMARY
[FreeTextEntry1] : VÍCTOR GIL  is a 83 year F  who presents today June 16, 2023 with the above history and the following active issues. \par \par She comes in for urgent evaluation today as she noticed her blood pressure at home was 190/100 and did not feel well.  She has been taking her medications.  She had taken trazodone which she takes it rarely yesterday evening.  She has no significant focal weakness.  Mild headache.\par She has been compliant with her diet.  And no alcohol intake.\par Recommended to take her antihypertensive therapy at nighttime.  She will keep records of her blood pressure readings at home.  If unable to control it then split the dosage in the form of metoprolol succinate 50 mg twice daily and amlodipine/benazepril at 2.5/10 mg twice daily.  Her blood pressure readings when she takes the medication in the morning appears to be very well controlled when taken by me in the afternoon and both upper extremity.\par Can also consider evaluation for secondary hypertension with renal artery Doppler study, metanephrine and other labs.\par Recommended to also see ophthalmologist.\par She had recent CT scan and other evaluation in the emergency room which did not reveal any significant abnormality.\par \par Moderate carotid atherosclerosis.  ASA 81mg QD recommended. \par \par Nonischemic cardiomyopathy. Preserved systolic function. Coronary angiography showed no significant obstructive disease. Continue lifestyle and risk factor modifications.  \par \par Hypertensive heart disease. Continue Amlodipine/Benazepril to 5/20mg. Continue Metoprolol 100mg. \par Low-salt diet. Increase her exercises.\par High risk symptoms she will call 911.\par BP goal <130/70.\par Reassessment in 2 weeks as per above. \par \par Hyperlipidemia.  Clarification regarding statin therapy needed. \par Low cholesterol diet. Follow-up fasting lipid panel results. \par Continue low saturated fat, carbohydrate intake.\par \par History of non rheumatic mitral insufficiency. No signs of left to right heart failure. Recommend improved blood pressure control.  Echocardiogram relatively stable with continued left atrial enlargement and nonrheumatic mild mitral insufficiency.\par \par Continue follow-up with ENT regarding thyroid nodule. \par \par Red flag symptoms which would warrant sooner emergent evaluation reviewed with the patient. \par Questions and concerns were addressed and answered. \par \par Counseling regarding low saturated fat, salt and carbohydrate intake was reviewed. Active lifestyle and regular. Exercise along with weight management is advised.\par All the above were at length reviewed. Answered all the questions. Thank you very much for this kind referral. Please do not hesitate to give me a call for any question.\par Part of this transcription was done with voice recognition software and phonetically similar errors are common. I apologize for that. Please do not hesitate to call for any questions due to above.\par \par Sincerely,\par Funmilayo Goldstein MD,FACC,FASE\par

## 2023-06-16 NOTE — PHYSICAL EXAM
[General Appearance - Well Developed] : well developed [Normal Appearance] : normal appearance [Well Groomed] : well groomed [General Appearance - Well Nourished] : well nourished [No Deformities] : no deformities [General Appearance - In No Acute Distress] : no acute distress [Respiration, Rhythm And Depth] : normal respiratory rhythm and effort [] : no respiratory distress [Exaggerated Use Of Accessory Muscles For Inspiration] : no accessory muscle use [Auscultation Breath Sounds / Voice Sounds] : lungs were clear to auscultation bilaterally [Heart Rate And Rhythm] : heart rate and rhythm were normal [Murmurs] : no murmurs present [Heart Sounds] : normal S1 and S2 [Abnormal Walk] : normal gait [Nail Clubbing] : no clubbing of the fingernails [Cyanosis, Localized] : no localized cyanosis [Oriented To Time, Place, And Person] : oriented to person, place, and time [Affect] : the affect was normal [Mood] : the mood was normal [No Anxiety] : not feeling anxious

## 2023-06-17 ENCOUNTER — OFFICE (OUTPATIENT)
Dept: URBAN - METROPOLITAN AREA CLINIC 38 | Facility: CLINIC | Age: 83
Setting detail: OPHTHALMOLOGY
End: 2023-06-17
Payer: MEDICARE

## 2023-06-17 DIAGNOSIS — H35.033: ICD-10-CM

## 2023-06-17 DIAGNOSIS — H35.3131: ICD-10-CM

## 2023-06-17 DIAGNOSIS — H52.7: ICD-10-CM

## 2023-06-17 PROBLEM — H26.493 POSTERIOR CAPSULAR OPACIFICATION; BOTH EYES: Status: ACTIVE | Noted: 2023-06-17

## 2023-06-17 PROBLEM — H35.413 LATTICE DEGENERATION; BOTH EYES: Status: ACTIVE | Noted: 2023-06-17

## 2023-06-17 PROCEDURE — 99213 OFFICE O/P EST LOW 20 MIN: CPT | Performed by: OPTOMETRIST

## 2023-06-17 PROCEDURE — 92015 DETERMINE REFRACTIVE STATE: CPT | Performed by: OPTOMETRIST

## 2023-06-17 ASSESSMENT — REFRACTION_MANIFEST
OD_SPHERE: PLANO
OD_VA1: 20/20-1
OS_SPHERE: -0.50
OU_VA: 20/20-
OS_ADD: +3.00
OD_ADD: +3.00
OD_CYLINDER: -0.50
OD_AXIS: 080
OS_CYLINDER: -0.75
OS_AXIS: 095
OS_VA1: 20/30+
OD_VA2: 20/40(J3)
OD_SPHERE: -0.25
OS_AXIS: 070
OD_CYLINDER: -0.50
OD_VA1: 20/25
OS_VA2: 20/40(J3)
OS_SPHERE: -1.00
OD_AXIS: 080
OS_VA1: 20/30+
OS_CYLINDER: -0.25

## 2023-06-17 ASSESSMENT — TONOMETRY
OS_IOP_MMHG: 15
OD_IOP_MMHG: 17

## 2023-06-17 ASSESSMENT — KERATOMETRY
OD_AXISANGLE_DEGREES: 090
OD_K1POWER_DIOPTERS: 45.00
OS_K1POWER_DIOPTERS: 45.50
OS_K2POWER_DIOPTERS: 45.75
METHOD_AUTO_MANUAL: AUTO
OS_AXISANGLE_DEGREES: 086
OD_K2POWER_DIOPTERS: 45.00

## 2023-06-17 ASSESSMENT — REFRACTION_CURRENTRX
OD_VPRISM_DIRECTION: SV
OS_ADD: +2.00
OS_OVR_VA: 20/
OS_VPRISM_DIRECTION: SV
OD_ADD: +2.00
OD_OVR_VA: 20/

## 2023-06-17 ASSESSMENT — VISUAL ACUITY
OS_BCVA: 20/25
OD_BCVA: 20/70

## 2023-06-17 ASSESSMENT — REFRACTION_AUTOREFRACTION
OS_AXIS: 100
OS_SPHERE: -1.25
OS_CYLINDER: -0.25
OD_SPHERE: -0.50
OD_AXIS: 081
OD_CYLINDER: -0.75

## 2023-06-17 ASSESSMENT — SPHEQUIV_DERIVED
OS_SPHEQUIV: -1.125
OD_SPHEQUIV: -0.875
OS_SPHEQUIV: -0.875
OD_SPHEQUIV: -0.5
OS_SPHEQUIV: -1.375

## 2023-06-17 ASSESSMENT — CONFRONTATIONAL VISUAL FIELD TEST (CVF)
OS_FINDINGS: FULL
OD_FINDINGS: FULL

## 2023-06-17 ASSESSMENT — AXIALLENGTH_DERIVED
OS_AL: 23.1603
OS_AL: 23.3496
OD_AL: 23.3837
OD_AL: 23.241
OS_AL: 23.2546

## 2023-06-28 ENCOUNTER — APPOINTMENT (OUTPATIENT)
Dept: CARDIOLOGY | Facility: CLINIC | Age: 83
End: 2023-06-28
Payer: MEDICARE

## 2023-06-28 VITALS
DIASTOLIC BLOOD PRESSURE: 50 MMHG | OXYGEN SATURATION: 95 % | HEART RATE: 55 BPM | HEIGHT: 65 IN | SYSTOLIC BLOOD PRESSURE: 132 MMHG | WEIGHT: 128 LBS | BODY MASS INDEX: 21.33 KG/M2

## 2023-06-28 PROCEDURE — 99214 OFFICE O/P EST MOD 30 MIN: CPT

## 2023-06-28 NOTE — ADDENDUM
[FreeTextEntry1] : Please note the patient was reviewed with the PA.\par I was directly involved in the management plan and recommendations of care provided to the patient. \par I personally reviewed the history and physical exam and plan as documented by the PA above.\par \par Dontrell Castanon DO, FACC, RPVI\par Cardiologist\par 6/28/2023\par \par

## 2023-06-28 NOTE — CARDIOLOGY SUMMARY
[de-identified] : January 31, 2022.  Sinus bradycardia. [de-identified] : December 14, 2021.  LVEF 60% mild mitral and minimal aortic regurgitation severely dilated left atrium\par \par December 12, 2022 EF 55-60%, dilated left atrium, Mild MR,  [de-identified] : Cardiac cath 2017 - non-obstructive disease [de-identified] : Bilateral carotid Doppler study.  December 14, 2021.  R ICA less than 50% left ICA 50 to 69%.

## 2023-06-28 NOTE — REASON FOR VISIT
[Structural Heart and Valve Disease] : structural heart and valve disease [Hyperlipidemia] : hyperlipidemia [Hypertension] : hypertension [Other: ____] : [unfilled] [FreeTextEntry3] : Dr. Gan [FreeTextEntry1] : VÍCTOR GIL  is a 83 year F  who presents today Jun 28, 2023 for reassessment of blood pressure. Last visit she was urgently seen for hypertensive blood pressure readings at home. Instructed to take her BP meds at night which she is doing and feeling better. BP on my assessment 126/60. \par \par Today she denies chest pain, pressure, unusual shortness of breath, lightheadedness, dizziness, near syncope or syncope. 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 Class I. Functional status.\par NSVT EP no recurrence. No syncopal episode.\par Hypertension. Recent changes in medication. \par Hyperlipidemia. On cholesterol-lowering agent be a much better control.\par Fibromyalgia mild-mod active controlled\par Thyroid nodules

## 2023-06-28 NOTE — REVIEW OF SYSTEMS
[Feeling Fatigued] : feeling fatigued [Joint Pain] : joint pain [Joint Stiffness] : joint stiffness [Easy Bruising] : a tendency for easy bruising [Negative] : Heme/Lymph [Under Stress] : not under stress

## 2023-06-28 NOTE — DISCUSSION/SUMMARY
[FreeTextEntry1] : VÍCTOR GIL  is a 83 year F  who presents today Jun 28, 2023 with the above history and the following active issues. \par \par Moderate carotid atherosclerosis.  ASA 81mg QD recommended. \par \par Nonischemic cardiomyopathy. Preserved systolic function. Coronary angiography showed no significant obstructive disease. Continue lifestyle and risk factor modifications.  \par \par Hypertensive heart disease. Continue Amlodipine/Benazepril and Metoprolol. \par Low-salt diet. Increase her exercises.\par High risk symptoms she will call 911.\par BP goal <130/70.\par \par Hyperlipidemia.  Clarification regarding statin therapy needed. \par Low cholesterol diet. Follow-up fasting lipid panel results. \par Continue low saturated fat, carbohydrate intake.\par \par History of non rheumatic mitral insufficiency. No signs of left to right heart failure. Recommend improved blood pressure control.  Echocardiogram relatively stable with continued left atrial enlargement and nonrheumatic mild mitral insufficiency.\par \par Continue follow-up with ENT regarding thyroid nodule. \par \par Red flag symptoms which would warrant sooner emergent evaluation reviewed with the patient. \par Questions and concerns were addressed and answered. \par \par Sincerely,\par \par Nani Cuenca PA-C\par supervising MD: Dr. Castanon

## 2023-07-07 ENCOUNTER — NON-APPOINTMENT (OUTPATIENT)
Age: 83
End: 2023-07-07

## 2023-07-17 ENCOUNTER — APPOINTMENT (OUTPATIENT)
Dept: CARDIOLOGY | Facility: CLINIC | Age: 83
End: 2023-07-17
Payer: MEDICARE

## 2023-07-17 VITALS
OXYGEN SATURATION: 98 % | SYSTOLIC BLOOD PRESSURE: 138 MMHG | HEART RATE: 59 BPM | HEIGHT: 65 IN | WEIGHT: 130 LBS | DIASTOLIC BLOOD PRESSURE: 64 MMHG | BODY MASS INDEX: 21.66 KG/M2

## 2023-07-17 DIAGNOSIS — R73.9 HYPERGLYCEMIA, UNSPECIFIED: ICD-10-CM

## 2023-07-17 PROCEDURE — 99214 OFFICE O/P EST MOD 30 MIN: CPT

## 2023-07-17 NOTE — CARDIOLOGY SUMMARY
[de-identified] : January 31, 2022.  Sinus bradycardia. [de-identified] : December 14, 2021.  LVEF 60% mild mitral and minimal aortic regurgitation severely dilated left atrium\par \par December 12, 2022 EF 55-60%, dilated left atrium, Mild MR,  [de-identified] : Cardiac cath 2017 - non-obstructive disease [de-identified] : Bilateral carotid Doppler study.  December 14, 2021.  R ICA less than 50% left ICA 50 to 69%.

## 2023-07-17 NOTE — PHYSICAL EXAM
[General Appearance - Well Developed] : well developed [Normal Appearance] : normal appearance [Well Groomed] : well groomed [General Appearance - Well Nourished] : well nourished [No Deformities] : no deformities [General Appearance - In No Acute Distress] : no acute distress [] : no respiratory distress [Respiration, Rhythm And Depth] : normal respiratory rhythm and effort [Exaggerated Use Of Accessory Muscles For Inspiration] : no accessory muscle use [Auscultation Breath Sounds / Voice Sounds] : lungs were clear to auscultation bilaterally [Heart Rate And Rhythm] : heart rate and rhythm were normal [Heart Sounds] : normal S1 and S2 [Murmurs] : no murmurs present [Abnormal Walk] : normal gait [Nail Clubbing] : no clubbing of the fingernails [Oriented To Time, Place, And Person] : oriented to person, place, and time [Cyanosis, Localized] : no localized cyanosis [Affect] : the affect was normal [Mood] : the mood was normal [No Anxiety] : not feeling anxious [FreeTextEntry1] : No edema

## 2023-07-17 NOTE — ASSESSMENT
[FreeTextEntry1] : Reviewed on July 17, 2023 Labs from June 26, 2023 CBC stable sodium 135 potassium 4.1 creatinine 0.84 hemoglobin A1c 5.5 total cholesterol 132 triglycerides 60 HDL 52 LDL 68

## 2023-07-17 NOTE — REASON FOR VISIT
[Structural Heart and Valve Disease] : structural heart and valve disease [Hyperlipidemia] : hyperlipidemia [Hypertension] : hypertension [Other: ____] : [unfilled] [FreeTextEntry3] : Dr. Gan [FreeTextEntry1] : VÍCTOR GIL  is a 83 year F  who presents today July 17, 2023 now with improved edema.  But complain of hair loss\par Blood pressure much better controlled.  Improved fatigue and tiredness.\par No chest pain.  Mild headache.\par \par Today she denies chest pain, pressure, unusual shortness of breath, lightheadedness, dizziness, near syncope or syncope. 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 Class I. Functional status.\par NSVT EP no recurrence. No syncopal episode.\par Hypertension. Recent changes in medication. \par Hyperlipidemia. On cholesterol-lowering agent be a much better control.\par Fibromyalgia mild-mod active controlled\par Thyroid nodules

## 2023-07-17 NOTE — DISCUSSION/SUMMARY
[FreeTextEntry1] : VÍCTOR GIL  is a 83 year F  who presents today July 17, 2023 with the above history and the following active issues. \par \par Hypertensive heart disease.  No CHF.  No CKD.  Improved volume status.  Continue metoprolol succinate 50 mg twice daily and amlodipine/benazepril at 2.5/10 mg twice daily.  Furosemide as needed based on the weight.  Continue low-salt diet.\par Follow blood pressure at home.\par Recommended to also see ophthalmologist.\par She had recent CT scan and other evaluation in the emergency room which did not reveal any significant abnormality.\par \par Moderate carotid atherosclerosis.  ASA 81mg QD recommended.  Continue statin therapy.\par \par Nonischemic cardiomyopathy. Preserved systolic function. Coronary angiography showed no significant obstructive disease. Continue lifestyle and risk factor modifications.  \par \par Hypertensive heart disease. Continue Amlodipine/Benazepril to 5/20mg. Continue Metoprolol 100mg. \par Low-salt diet. Increase her exercises.\par High risk symptoms she will call 911.\par BP goal <130/70.\par Reassessment in 2 weeks as per above. \par \par Hyperlipidemia.  Continue statin therapy\par Continue low saturated fat, carbohydrate intake.\par \par History of non rheumatic mitral insufficiency. No signs of left to right heart failure. Recommend improved blood pressure control.  Echocardiogram relatively stable with continued left atrial enlargement and nonrheumatic mild mitral insufficiency.\par \par Continue follow-up with ENT regarding thyroid nodule. \par \par Hair loss.  History of scalp problem.  Recommend discuss with you and dermatologist.  Check TSH\par \par Red flag symptoms which would warrant sooner emergent evaluation reviewed with the patient. \par Questions and concerns were addressed and answered. \par \par Counseling regarding low saturated fat, salt and carbohydrate intake was reviewed. Active lifestyle and regular. Exercise along with weight management is advised.\par All the above were at length reviewed. Answered all the questions. Thank you very much for this kind referral. Please do not hesitate to give me a call for any question.\par Part of this transcription was done with voice recognition software and phonetically similar errors are common. I apologize for that. Please do not hesitate to call for any questions due to above.\par \par Sincerely,\par Funmilayo Goldstein MD,FACC,FASE\par

## 2023-10-27 ENCOUNTER — APPOINTMENT (OUTPATIENT)
Dept: CARDIOLOGY | Facility: CLINIC | Age: 83
End: 2023-10-27

## 2023-10-30 ENCOUNTER — APPOINTMENT (OUTPATIENT)
Dept: CARDIOLOGY | Facility: CLINIC | Age: 83
End: 2023-10-30
Payer: MEDICARE

## 2023-10-30 VITALS
WEIGHT: 135 LBS | OXYGEN SATURATION: 94 % | SYSTOLIC BLOOD PRESSURE: 136 MMHG | HEART RATE: 56 BPM | BODY MASS INDEX: 22.49 KG/M2 | HEIGHT: 65 IN | DIASTOLIC BLOOD PRESSURE: 70 MMHG

## 2023-10-30 PROCEDURE — 99214 OFFICE O/P EST MOD 30 MIN: CPT

## 2023-10-30 RX ORDER — TRAZODONE HYDROCHLORIDE 300 MG/1
TABLET ORAL
Refills: 0 | Status: DISCONTINUED | COMMUNITY
End: 2023-10-30

## 2023-10-30 RX ORDER — FUROSEMIDE 20 MG/1
20 TABLET ORAL
Qty: 30 | Refills: 0 | Status: DISCONTINUED | COMMUNITY
Start: 2023-07-17 | End: 2023-10-30

## 2023-10-30 RX ORDER — METOPROLOL SUCCINATE 100 MG/1
100 TABLET, EXTENDED RELEASE ORAL DAILY
Qty: 90 | Refills: 3 | Status: DISCONTINUED | COMMUNITY
Start: 1900-01-01 | End: 2023-10-30

## 2023-10-30 RX ORDER — LOPERAMIDE HYDROCHLORIDE 1 MG/7.5ML
SOLUTION ORAL
Refills: 0 | Status: DISCONTINUED | COMMUNITY
End: 2023-10-30

## 2023-11-01 ENCOUNTER — NON-APPOINTMENT (OUTPATIENT)
Age: 83
End: 2023-11-01

## 2023-11-26 ENCOUNTER — NON-APPOINTMENT (OUTPATIENT)
Age: 83
End: 2023-11-26

## 2023-12-07 ENCOUNTER — APPOINTMENT (OUTPATIENT)
Dept: CARDIOLOGY | Facility: CLINIC | Age: 83
End: 2023-12-07
Payer: MEDICARE

## 2023-12-07 PROCEDURE — 93880 EXTRACRANIAL BILAT STUDY: CPT

## 2023-12-07 PROCEDURE — 93306 TTE W/DOPPLER COMPLETE: CPT

## 2023-12-08 ENCOUNTER — NON-APPOINTMENT (OUTPATIENT)
Age: 83
End: 2023-12-08

## 2023-12-08 DIAGNOSIS — E04.1 NONTOXIC SINGLE THYROID NODULE: ICD-10-CM

## 2024-02-15 NOTE — PHYSICAL EXAM
[Normal] : no edema, no cyanosis, no clubbing, no varicosities [Normal Speech] : normal speech [Alert and Oriented] : alert and oriented

## 2024-02-16 ENCOUNTER — APPOINTMENT (OUTPATIENT)
Dept: CARDIOLOGY | Facility: CLINIC | Age: 84
End: 2024-02-16
Payer: MEDICARE

## 2024-02-16 VITALS
OXYGEN SATURATION: 99 % | BODY MASS INDEX: 22.33 KG/M2 | HEIGHT: 65 IN | DIASTOLIC BLOOD PRESSURE: 70 MMHG | WEIGHT: 134 LBS | SYSTOLIC BLOOD PRESSURE: 180 MMHG | HEART RATE: 63 BPM

## 2024-02-16 DIAGNOSIS — R94.31 ABNORMAL ELECTROCARDIOGRAM [ECG] [EKG]: ICD-10-CM

## 2024-02-16 DIAGNOSIS — I65.23 OCCLUSION AND STENOSIS OF BILATERAL CAROTID ARTERIES: ICD-10-CM

## 2024-02-16 DIAGNOSIS — I34.0 NONRHEUMATIC MITRAL (VALVE) INSUFFICIENCY: ICD-10-CM

## 2024-02-16 DIAGNOSIS — I11.9 HYPERTENSIVE HEART DISEASE W/OUT HEART FAILURE: ICD-10-CM

## 2024-02-16 PROCEDURE — G2211 COMPLEX E/M VISIT ADD ON: CPT

## 2024-02-16 PROCEDURE — 99215 OFFICE O/P EST HI 40 MIN: CPT

## 2024-02-16 RX ORDER — ASPIRIN 81 MG/1
81 TABLET ORAL
Refills: 0 | Status: ACTIVE | COMMUNITY
Start: 2024-02-16

## 2024-02-16 NOTE — REASON FOR VISIT
[Structural Heart and Valve Disease] : structural heart and valve disease [Hyperlipidemia] : hyperlipidemia [Hypertension] : hypertension [Other: ____] : [unfilled] [FreeTextEntry3] : Dr. Gan [FreeTextEntry1] : VÍCTOR GIL  is a 84 year F  who presents today for preoperative cardiac assessment prior to orthopedic surgery.  Today she denies chest pain, pressure, unusual shortness of breath, lightheadedness, dizziness, near syncope or syncope.  She states compliance with medication.  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 Class I. Functional status. NSVT EP no recurrence. No syncopal episode. Hypertension. Recent changes in medication.  Hyperlipidemia. On cholesterol-lowering agent be a much better control. Fibromyalgia mild-mod active controlled Thyroid nodules

## 2024-02-16 NOTE — DISCUSSION/SUMMARY
[FreeTextEntry1] : VÍCTOR GIL  is a 84 year F  who presents today  with the above history and the following active issues.   Preoperative cardiac assessment.  EKG changes.  New wall motion abnormality.  Elevated blood pressure.  No symptoms.  Home blood pressure readings according to her are around 140/90 and below.  Recommended to keep records again.  Recommended pharmacological myocardial perfusion scan to rule out any significant high risk ischemia.  She had no significant coronary artery disease in 2017.  In absence of symptoms and overall preserved EF we will look for ischemic burden and decide whether any further invasive or noninvasive coronary angiography is needed or not.Risk benefits alternatives of above evaluation reviewed. Technetium 99 radioisotope use was reviewed.  Risks, benefits, alternatives of use of radioisotope was discussed at length.  Patient verbalized understanding and agreed with the management plan. She will have her blood pressure check again and she will bring her home readings so that appropriate adjustment of the medications can be done.  As long as above stable she will be stable from cardiac point of view with moderate risk for perioperative cardiac vascular morbidity mortality for orthopedic procedure/surgery.   I will do final addendum after the bowel test and blood pressure readings.  Hypertensive heart disease.  No CHF.  No CKD.  Improved volume status.  Continue labetalol 100 mg twice daily amlodipine/benazepril 5/20 once daily.  Could not tolerate twice a day.  Furosemide as needed based on the weight.  Continue low-salt diet. goal for blood pressure less than 140/90 preferably less than 130/80 Follow blood pressure at home.  Moderate carotid atherosclerosis.  ASA 81mg QD recommended.  Continue statin therapy.  h/o Nonischemic cardiomyopathy. Preserved systolic function. Coronary angiography showed no significant obstructive disease 2017. Continue lifestyle and risk factor modifications.    Hyperlipidemia.  Continue statin therapy Continue low saturated fat, carbohydrate intake.  History of non rheumatic mitral insufficiency. No signs of left to right heart failure. Recommend improved blood pressure control.  Overall stable valvular heart disease with preserved EF on echocardiogram December 2023.  Continue follow-up with ENT regarding thyroid nodule.   Red flag symptoms which would warrant sooner emergent evaluation reviewed with the patient.  Questions and concerns were addressed and answered.   Counseling regarding low saturated fat, salt and carbohydrate intake was reviewed. Active lifestyle and regular. Exercise along with weight management is advised. All the above were at length reviewed. Answered all the questions. Thank you very much for this kind referral. Please do not hesitate to give me a call for any question. Part of this transcription was done with voice recognition software and phonetically similar errors are common. I apologize for that. Please do not hesitate to call for any questions due to above.  Sincerely, Funmilayo Goldstein MD,FACC,[facility]

## 2024-02-16 NOTE — REVIEW OF SYSTEMS
[Headache] : headache [Feeling Fatigued] : feeling fatigued [Joint Pain] : joint pain [Joint Stiffness] : joint stiffness [Under Stress] : under stress [Easy Bruising] : a tendency for easy bruising [Negative] : Heme/Lymph [FreeTextEntry2] : 180/70, heart rate 63, weight 234 pounds, 99% room air saturation.  Repeat blood pressure 170/70.

## 2024-02-16 NOTE — CARDIOLOGY SUMMARY
[de-identified] : January 31, 2022.  Sinus bradycardia. EKG from June 5, 2023 had shown normal sinus rhythm inferior infarct of undetermined age.  Poor R wave progression.  No changes compared to before.  [de-identified] : December 14, 2021.  LVEF 60% mild mitral and minimal aortic regurgitation severely dilated left atrium December 12, 2022 EF 55-60%, dilated left atrium, Mild MR,  12/7/23 55%lae, Diastolic dysfunction. WMA.mild mor MR, Mod TR  new WMA [de-identified] : Cardiac cath 2017 - non-obstructive disease [de-identified] : Bilateral carotid Doppler study.  December 14, 2021.  R ICA less than 50% left ICA 50 to 69%. 12/7/23 LICA 50-69% thyroid nodule  abdominal aortic ultrasound 12/7/23 no AAA

## 2024-02-16 NOTE — ASSESSMENT
[FreeTextEntry1] : reviewed   October 30, 2023. Labs from July were reviewed. Her blood pressure readings from home were reviewed.    Reviewed on  February 16, 2024.  echocardiogram, carotid Doppler study were reviewed.   Prior EKG also was reviewed.

## 2024-02-20 ENCOUNTER — APPOINTMENT (OUTPATIENT)
Dept: CARDIOLOGY | Facility: CLINIC | Age: 84
End: 2024-02-20
Payer: MEDICARE

## 2024-02-20 PROCEDURE — 93015 CV STRESS TEST SUPVJ I&R: CPT

## 2024-02-20 PROCEDURE — 78452 HT MUSCLE IMAGE SPECT MULT: CPT

## 2024-02-20 PROCEDURE — A9502: CPT

## 2024-03-18 ENCOUNTER — APPOINTMENT (OUTPATIENT)
Dept: CARDIOLOGY | Facility: CLINIC | Age: 84
End: 2024-03-18
Payer: MEDICARE

## 2024-03-18 VITALS
HEIGHT: 65 IN | BODY MASS INDEX: 22.16 KG/M2 | OXYGEN SATURATION: 98 % | SYSTOLIC BLOOD PRESSURE: 160 MMHG | DIASTOLIC BLOOD PRESSURE: 60 MMHG | HEART RATE: 59 BPM | WEIGHT: 133 LBS

## 2024-03-18 DIAGNOSIS — I42.9 CARDIOMYOPATHY, UNSPECIFIED: ICD-10-CM

## 2024-03-18 DIAGNOSIS — Z01.810 ENCOUNTER FOR PREPROCEDURAL CARDIOVASCULAR EXAMINATION: ICD-10-CM

## 2024-03-18 DIAGNOSIS — E78.5 HYPERLIPIDEMIA, UNSPECIFIED: ICD-10-CM

## 2024-03-18 DIAGNOSIS — I10 ESSENTIAL (PRIMARY) HYPERTENSION: ICD-10-CM

## 2024-03-18 PROCEDURE — 99214 OFFICE O/P EST MOD 30 MIN: CPT

## 2024-03-18 RX ORDER — ALENDRONATE SODIUM 70 MG/1
70 TABLET ORAL
Refills: 0 | Status: DISCONTINUED | COMMUNITY
End: 2024-03-18

## 2024-03-18 NOTE — CARDIOLOGY SUMMARY
[de-identified] : Nuclear stress test 2/2024 no ischemia [de-identified] : January 31, 2022.  Sinus bradycardia. EKG from June 5, 2023 had shown normal sinus rhythm inferior infarct of undetermined age.  Poor R wave progression.  No changes compared to before.  [de-identified] : December 14, 2021.  LVEF 60% mild mitral and minimal aortic regurgitation severely dilated left atrium December 12, 2022 EF 55-60%, dilated left atrium, Mild MR,  12/7/23 55%lae, Diastolic dysfunction. WMA.mild mor MR, Mod TR  new WMA [de-identified] : Bilateral carotid Doppler study.  December 14, 2021.  R ICA less than 50% left ICA 50 to 69%. 12/7/23 LICA 50-69% thyroid nodule  abdominal aortic ultrasound 12/7/23 no AAA [de-identified] : Cardiac cath 2017 - non-obstructive disease

## 2024-03-18 NOTE — REASON FOR VISIT
[Structural Heart and Valve Disease] : structural heart and valve disease [Hyperlipidemia] : hyperlipidemia [Hypertension] : hypertension [Other: ____] : [unfilled] [FreeTextEntry3] : Dr. Gan [FreeTextEntry1] : VÍCTOR GIL  is a 84 year F  who presents today for preoperative cardiac assessment prior to orthopedic surgery. Overall she has been feeling well. There has been no recent illness or hospital stay. Medications have remained unchanged. Asymptomatic from cardiovascular and arrhythmia standpoint.   Today she denies chest pain, pressure, unusual shortness of breath, lightheadedness, dizziness, near syncope or syncope.  She states compliance with medication.  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 Class I. Functional status. NSVT EP no recurrence. No syncopal episode. Hypertension. Recent changes in medication.  Hyperlipidemia. On cholesterol-lowering agent be a much better control. Fibromyalgia mild-mod active controlled Thyroid nodules

## 2024-03-18 NOTE — DISCUSSION/SUMMARY
[FreeTextEntry1] : VÍCTOR GIL  is a 84 year F  who presents today Mar 18, 2024 with the above history and the following active issues.  Hypertensive heart disease.  No CHF.  No CKD.  Improved volume status.  Continue labetalol 100 mg twice daily and amlodipine/benazepril 5/20 once daily.  Could not tolerate twice a day.  Furosemide as needed based on the weight.  Continue low-salt diet. goal for blood pressure less than 140/90 preferably less than 130/80 Follow blood pressure at home.  Moderate carotid atherosclerosis.  ASA 81mg QD recommended.  Continue statin therapy.  h/o Nonischemic cardiomyopathy. Preserved systolic function. Coronary angiography showed no significant obstructive disease 2017. Continue lifestyle and risk factor modifications.    Hyperlipidemia.  Continue statin therapy Continue low saturated fat, carbohydrate intake.  History of non rheumatic mitral insufficiency. No signs of left to right heart failure. Recommend improved blood pressure control.  Overall stable valvular heart disease with preserved EF on echocardiogram December 2023.  Continue follow-up with ENT regarding thyroid nodule.   Preoperative status for right knee replacement with Dr. Almeida at [facility] At present, there are no active cardiac conditions.  No recent unstable coronary syndromes, decompensated heart failure, severe valvular heart disease or significant dysrhythmias.   Baseline functional status is limited.     The clinical benefit of the proposed procedure outweighs the associated cardiovascular risk.   Risk not attenuated with further CV testing.   Prior testing as outlined above. Optimized from a cardiovascular perspective. Continue beta blocker DVT ppx  Red flag symptoms which would warrant sooner emergent evaluation reviewed with the patient.  Questions and concerns were addressed and answered.   Sincerely,  Nani Cuenca PA-C Patients history, testing and plan reviewed with supervising MD: Dr. Funmilayo Goldstein

## 2024-03-29 ENCOUNTER — TRANSCRIPTION ENCOUNTER (OUTPATIENT)
Age: 84
End: 2024-03-29

## 2024-04-27 ENCOUNTER — RX RENEWAL (OUTPATIENT)
Age: 84
End: 2024-04-27

## 2024-04-28 RX ORDER — LABETALOL HYDROCHLORIDE 100 MG/1
100 TABLET, FILM COATED ORAL
Qty: 180 | Refills: 0 | Status: ACTIVE | COMMUNITY
Start: 2023-10-30

## 2024-05-01 ENCOUNTER — OFFICE (OUTPATIENT)
Dept: URBAN - METROPOLITAN AREA CLINIC 8 | Facility: CLINIC | Age: 84
Setting detail: OPHTHALMOLOGY
End: 2024-05-01
Payer: MEDICARE

## 2024-05-01 DIAGNOSIS — H43.813: ICD-10-CM

## 2024-05-01 DIAGNOSIS — H35.3131: ICD-10-CM

## 2024-05-01 DIAGNOSIS — H52.4: ICD-10-CM

## 2024-05-01 DIAGNOSIS — Z96.1: ICD-10-CM

## 2024-05-01 DIAGNOSIS — H11.153: ICD-10-CM

## 2024-05-01 DIAGNOSIS — H33.311: ICD-10-CM

## 2024-05-01 DIAGNOSIS — H26.493: ICD-10-CM

## 2024-05-01 DIAGNOSIS — H35.413: ICD-10-CM

## 2024-05-01 PROCEDURE — 92014 COMPRE OPH EXAM EST PT 1/>: CPT | Performed by: OPHTHALMOLOGY

## 2024-05-01 PROCEDURE — 92134 CPTRZ OPH DX IMG PST SGM RTA: CPT | Performed by: OPHTHALMOLOGY

## 2024-05-01 PROCEDURE — 92015 DETERMINE REFRACTIVE STATE: CPT | Performed by: OPHTHALMOLOGY

## 2024-05-01 ASSESSMENT — CONFRONTATIONAL VISUAL FIELD TEST (CVF)
OS_FINDINGS: FULL
OD_FINDINGS: FULL

## 2024-05-22 ENCOUNTER — TRANSCRIPTION ENCOUNTER (OUTPATIENT)
Age: 84
End: 2024-05-22

## 2024-06-18 RX ORDER — AMLODIPINE BESYLATE AND BENAZEPRIL HYDROCHLORIDE 5; 20 MG/1; MG/1
5-20 CAPSULE ORAL
Qty: 90 | Refills: 0 | Status: ACTIVE | COMMUNITY
Start: 2021-11-29 | End: 1900-01-01

## 2024-08-22 ENCOUNTER — APPOINTMENT (OUTPATIENT)
Dept: CARDIOLOGY | Facility: CLINIC | Age: 84
End: 2024-08-22
Payer: MEDICARE

## 2024-08-22 VITALS — OXYGEN SATURATION: 97 % | WEIGHT: 130 LBS | BODY MASS INDEX: 21.66 KG/M2 | HEART RATE: 70 BPM | HEIGHT: 65 IN

## 2024-08-22 VITALS — DIASTOLIC BLOOD PRESSURE: 70 MMHG | SYSTOLIC BLOOD PRESSURE: 128 MMHG

## 2024-08-22 DIAGNOSIS — I11.9 HYPERTENSIVE HEART DISEASE W/OUT HEART FAILURE: ICD-10-CM

## 2024-08-22 DIAGNOSIS — I42.9 CARDIOMYOPATHY, UNSPECIFIED: ICD-10-CM

## 2024-08-22 DIAGNOSIS — I34.0 NONRHEUMATIC MITRAL (VALVE) INSUFFICIENCY: ICD-10-CM

## 2024-08-22 DIAGNOSIS — E78.5 HYPERLIPIDEMIA, UNSPECIFIED: ICD-10-CM

## 2024-08-22 PROCEDURE — 99214 OFFICE O/P EST MOD 30 MIN: CPT

## 2024-08-22 PROCEDURE — G2211 COMPLEX E/M VISIT ADD ON: CPT

## 2024-08-22 NOTE — REASON FOR VISIT
[Structural Heart and Valve Disease] : structural heart and valve disease [Hyperlipidemia] : hyperlipidemia [Hypertension] : hypertension [Other: ____] : [unfilled] [FreeTextEntry3] : Dr. Gan [FreeTextEntry1] : VÍCTOR GIL  is a 84 year F  who presents today for  Overall she has been feeling well. There has been no recent illness or hospital stay. Medications have remained unchanged. Asymptomatic from cardiovascular and arrhythmia standpoint.  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  Her knees are feeling better.  Today she denies chest pain, pressure, unusual shortness of breath, lightheadedness, dizziness, near syncope or syncope.  She states compliance with medication.  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 Class I. Functional status. NSVT EP no recurrence. No syncopal episode. Hypertension. Recent changes in medication.  Hyperlipidemia. On cholesterol-lowering agent be a much better control. Fibromyalgia mild-mod active controlled Thyroid nodules

## 2024-08-22 NOTE — ASSESSMENT
[FreeTextEntry1] : Reviewed on August 22, 2024. Most recent labs from May 21, 2024 CBC stable.  Total cholesterol 138 triglycerides 62 HDL 52 LDL 74.  TSH 0.98 sodium 142 potassium 4.4 creatinine 1.0

## 2024-08-22 NOTE — PHYSICAL EXAM
[Normal] : well developed, well nourished, no acute distress [Normal Venous Pressure] : normal venous pressure [Normal S1, S2] : normal S1, S2 [Murmur] : murmur [Clear Lung Fields] : clear lung fields [Edema ___] : edema [unfilled] [Venous stasis] : venous stasis [Venous varicosities] : venous varicosities [Normal Speech] : normal speech [Alert and Oriented] : alert and oriented

## 2024-08-22 NOTE — DISCUSSION/SUMMARY
[FreeTextEntry1] : VÍCTOR GIL  is a 84 year F  who presents today with the above history and the following active issues.  Hypertensive heart disease. improved BP today.  No CHF.  No CKD.  Improved volume status.  Continue labetalol 100 mg twice daily and amlodipine/benazepril 5/20 once daily.  Could not tolerate twice a day.  Furosemide as needed based on the weight.  Continue low-salt diet. goal for blood pressure less than 140/90 preferably less than 130/80 Follow blood pressure at home.  Moderate carotid atherosclerosis.  ASA 81mg QD recommended.  Continue statin therapy.  h/o Nonischemic cardiomyopathy. Preserved systolic function. Coronary angiography showed no significant obstructive disease 2017. Continue lifestyle and risk factor modifications.    Hyperlipidemia.  Continue statin therapy Continue low saturated fat, carbohydrate intake.  History of non rheumatic mitral insufficiency. No signs of left to right heart failure.  improved blood pressure control.  Overall stable valvular heart disease with preserved EF on echocardiogram December 2023.  Continue follow-up with ENT regarding thyroid nodule.    Red flag symptoms which would warrant sooner emergent evaluation reviewed with the patient.  Questions and concerns were addressed and answered.  Counseling regarding low saturated fat,salt and carbohydrate intake was reviewed. Active lifestyle and regular exercise  along with weight management is advised. I have reviewed above at length. I answered all the questions. Patient verbalized understandings. Thank you very much for allowing me to participate in your patient's care. Please feel free to call me for any questions.  Sincerely,  Funmilayo Goldstein MD, [facility], [place]

## 2024-08-22 NOTE — CARDIOLOGY SUMMARY
[de-identified] : January 31, 2022.  Sinus bradycardia. EKG from June 5, 2023 had shown normal sinus rhythm inferior infarct of undetermined age.  Poor R wave progression.  No changes compared to before.  [de-identified] : Nuclear stress test 2/2024 no ischemia [de-identified] : December 14, 2021.  LVEF 60% mild mitral and minimal aortic regurgitation severely dilated left atrium December 12, 2022 EF 55-60%, dilated left atrium, Mild MR,  12/7/23 55%lae, Diastolic dysfunction. WMA.mild mor MR, Mod TR  new WMA [de-identified] : Cardiac cath 2017 - non-obstructive disease [de-identified] : Bilateral carotid Doppler study.  December 14, 2021.  R ICA less than 50% left ICA 50 to 69%. 12/7/23 LICA 50-69% thyroid nodule  abdominal aortic ultrasound 12/7/23 no AAA

## 2024-08-22 NOTE — CARDIOLOGY SUMMARY
[de-identified] : January 31, 2022.  Sinus bradycardia. EKG from June 5, 2023 had shown normal sinus rhythm inferior infarct of undetermined age.  Poor R wave progression.  No changes compared to before.  [de-identified] : Nuclear stress test 2/2024 no ischemia [de-identified] : December 14, 2021.  LVEF 60% mild mitral and minimal aortic regurgitation severely dilated left atrium December 12, 2022 EF 55-60%, dilated left atrium, Mild MR,  12/7/23 55%lae, Diastolic dysfunction. WMA.mild mor MR, Mod TR  new WMA [de-identified] : Cardiac cath 2017 - non-obstructive disease [de-identified] : Bilateral carotid Doppler study.  December 14, 2021.  R ICA less than 50% left ICA 50 to 69%. 12/7/23 LICA 50-69% thyroid nodule  abdominal aortic ultrasound 12/7/23 no AAA

## 2024-10-02 ENCOUNTER — OFFICE (OUTPATIENT)
Dept: URBAN - METROPOLITAN AREA CLINIC 97 | Facility: CLINIC | Age: 84
Setting detail: OPHTHALMOLOGY
End: 2024-10-02
Payer: MEDICARE

## 2024-10-02 DIAGNOSIS — H01.001: ICD-10-CM

## 2024-10-02 DIAGNOSIS — H01.004: ICD-10-CM

## 2024-10-02 PROBLEM — H26.493 POSTERIOR CAPSULAR OPACIFICATION; BOTH EYES: Status: ACTIVE | Noted: 2024-10-02

## 2024-10-02 PROCEDURE — 99213 OFFICE O/P EST LOW 20 MIN: CPT | Performed by: OPHTHALMOLOGY

## 2024-10-02 ASSESSMENT — REFRACTION_CURRENTRX
OS_AXIS: 083
OD_VPRISM_DIRECTION: SV
OD_CYLINDER: -0.50
OS_CYLINDER: -0.75
OS_VPRISM_DIRECTION: SV
OS_OVR_VA: 20/
OD_SPHERE: PLANO
OS_VPRISM_DIRECTION: SV
OS_OVR_VA: 20/
OD_OVR_VA: 20/
OD_ADD: +2.00
OD_OVR_VA: 20/
OS_ADD: +2.00
OD_VPRISM_DIRECTION: SV
OS_SPHERE: -0.25
OD_AXIS: 068

## 2024-10-02 ASSESSMENT — REFRACTION_MANIFEST
OD_SPHERE: -0.75
OS_VA1: 20/40
OS_CYLINDER: -0.50
OD_ADD: +2.75
OD_AXIS: 090
OD_VA2: 20/20
OD_CYLINDER: -0.50
OS_VA2: 20/20
OS_VA2: 20/20
OD_VA1: 20/25
OS_CYLINDER: -0.75
OD_AXIS: 090
OS_SPHERE: -0.75
OD_VA1: 20/25
OU_VA: 20/25-
OS_AXIS: 050
OD_CYLINDER: -0.50
OS_ADD: +2.75
OD_SPHERE: -0.25
OS_SPHERE: -0.75
OS_ADD: +2.75
OD_VA2: 20/20
OS_AXIS: 050
OU_VA: 20/25-
OS_VA1: 20/40
OD_ADD: +2.75

## 2024-10-02 ASSESSMENT — KERATOMETRY
OS_K2POWER_DIOPTERS: 45.50
METHOD_AUTO_MANUAL: AUTO
OS_K1POWER_DIOPTERS: 45.25
OD_K2POWER_DIOPTERS: 45.50
OS_AXISANGLE_DEGREES: 097
OD_AXISANGLE_DEGREES: 098
OD_K1POWER_DIOPTERS: 45.25

## 2024-10-02 ASSESSMENT — LID EXAM ASSESSMENTS
OD_BLEPHARITIS: RUL 2+
OS_BLEPHARITIS: LUL 2+

## 2024-10-02 ASSESSMENT — REFRACTION_AUTOREFRACTION
OD_SPHERE: -0.75
OD_AXIS: 092
OD_CYLINDER: -0.50
OS_CYLINDER: -0.50
OS_AXIS: 049
OS_SPHERE: -0.75

## 2024-10-02 ASSESSMENT — VISUAL ACUITY
OS_BCVA: 20/30-2
OD_BCVA: 20/70+

## 2024-10-02 ASSESSMENT — CONFRONTATIONAL VISUAL FIELD TEST (CVF)
OS_FINDINGS: FULL
OD_FINDINGS: FULL

## 2024-11-13 ENCOUNTER — OFFICE (OUTPATIENT)
Dept: URBAN - METROPOLITAN AREA CLINIC 8 | Facility: CLINIC | Age: 84
Setting detail: OPHTHALMOLOGY
End: 2024-11-13
Payer: MEDICARE

## 2024-11-13 DIAGNOSIS — Z96.1: ICD-10-CM

## 2024-11-13 DIAGNOSIS — H35.413: ICD-10-CM

## 2024-11-13 DIAGNOSIS — H35.3131: ICD-10-CM

## 2024-11-13 DIAGNOSIS — H33.311: ICD-10-CM

## 2024-11-13 DIAGNOSIS — H26.491: ICD-10-CM

## 2024-11-13 DIAGNOSIS — H11.153: ICD-10-CM

## 2024-11-13 DIAGNOSIS — H01.004: ICD-10-CM

## 2024-11-13 DIAGNOSIS — H01.001: ICD-10-CM

## 2024-11-13 DIAGNOSIS — H43.813: ICD-10-CM

## 2024-11-13 PROCEDURE — 92014 COMPRE OPH EXAM EST PT 1/>: CPT | Performed by: OPHTHALMOLOGY

## 2024-11-13 ASSESSMENT — REFRACTION_AUTOREFRACTION
OD_SPHERE: -1.00
OD_CYLINDER: -0.25
OD_AXIS: 076
OS_CYLINDER: -1.00
OS_SPHERE: -1.00
OS_AXIS: 094

## 2024-11-13 ASSESSMENT — VISUAL ACUITY
OS_BCVA: 20/40-2
OD_BCVA: 20/50

## 2024-11-13 ASSESSMENT — REFRACTION_MANIFEST
OS_VA2: 20/20
OS_SPHERE: -0.75
OD_AXIS: 076
OS_VA2: 20/25
OS_VA1: 20/30+
OS_SPHERE: -0.75
OD_VA2: 20/20
OU_VA: 20/25
OD_CYLINDER: -0.50
OD_CYLINDER: -0.50
OU_VA: 20/25-
OD_AXIS: 090
OS_AXIS: 094
OD_VA2: 20/25
OS_VA1: 20/40
OD_SPHERE: -0.75
OD_SPHERE: -0.75
OS_ADD: +2.75
OD_VA1: 20/25
OS_CYLINDER: -0.75
OS_CYLINDER: -0.50
OD_ADD: +2.75
OS_ADD: +2.75
OD_ADD: +2.75
OD_VA1: 20/25
OS_AXIS: 050

## 2024-11-13 ASSESSMENT — KERATOMETRY
OS_AXISANGLE_DEGREES: 090
OD_K1POWER_DIOPTERS: 45.25
OD_K2POWER_DIOPTERS: 45.25
OS_K2POWER_DIOPTERS: 45.75
OD_AXISANGLE_DEGREES: 090
OS_K1POWER_DIOPTERS: 45.75
METHOD_AUTO_MANUAL: AUTO

## 2024-11-13 ASSESSMENT — REFRACTION_CURRENTRX
OS_SPHERE: -0.25
OS_AXIS: 083
OS_OVR_VA: 20/
OD_SPHERE: PLANO
OS_VPRISM_DIRECTION: SV
OD_VPRISM_DIRECTION: SV
OS_OVR_VA: 20/
OS_ADD: +2.00
OS_VPRISM_DIRECTION: SV
OD_CYLINDER: -0.50
OS_CYLINDER: -0.75
OD_OVR_VA: 20/
OD_OVR_VA: 20/
OD_ADD: +2.00
OD_AXIS: 068
OD_VPRISM_DIRECTION: SV

## 2024-11-13 ASSESSMENT — LID EXAM ASSESSMENTS
OS_BLEPHARITIS: LUL 2+
OD_BLEPHARITIS: RUL 2+

## 2024-11-13 ASSESSMENT — TONOMETRY
OS_IOP_MMHG: 16
OD_IOP_MMHG: 16

## 2024-11-13 ASSESSMENT — CONFRONTATIONAL VISUAL FIELD TEST (CVF)
OS_FINDINGS: FULL
OD_FINDINGS: FULL

## 2024-12-02 ENCOUNTER — APPOINTMENT (OUTPATIENT)
Dept: CARDIOLOGY | Facility: CLINIC | Age: 84
End: 2024-12-02
Payer: MEDICARE

## 2024-12-02 ENCOUNTER — NON-APPOINTMENT (OUTPATIENT)
Age: 84
End: 2024-12-02

## 2024-12-02 VITALS
SYSTOLIC BLOOD PRESSURE: 128 MMHG | OXYGEN SATURATION: 98 % | HEART RATE: 61 BPM | BODY MASS INDEX: 20.99 KG/M2 | WEIGHT: 126 LBS | DIASTOLIC BLOOD PRESSURE: 54 MMHG | HEIGHT: 65 IN

## 2024-12-02 DIAGNOSIS — R05.8 OTHER SPECIFIED COUGH: ICD-10-CM

## 2024-12-02 DIAGNOSIS — I11.9 HYPERTENSIVE HEART DISEASE W/OUT HEART FAILURE: ICD-10-CM

## 2024-12-02 DIAGNOSIS — R05.9 COUGH, UNSPECIFIED: ICD-10-CM

## 2024-12-02 DIAGNOSIS — E78.5 HYPERLIPIDEMIA, UNSPECIFIED: ICD-10-CM

## 2024-12-02 DIAGNOSIS — I42.9 CARDIOMYOPATHY, UNSPECIFIED: ICD-10-CM

## 2024-12-02 DIAGNOSIS — Z01.810 ENCOUNTER FOR PREPROCEDURAL CARDIOVASCULAR EXAMINATION: ICD-10-CM

## 2024-12-02 PROCEDURE — 93000 ELECTROCARDIOGRAM COMPLETE: CPT

## 2024-12-02 PROCEDURE — G2211 COMPLEX E/M VISIT ADD ON: CPT

## 2024-12-02 PROCEDURE — 99214 OFFICE O/P EST MOD 30 MIN: CPT

## 2024-12-02 RX ORDER — AZITHROMYCIN 500 MG/1
500 TABLET, FILM COATED ORAL DAILY
Qty: 1 | Refills: 0 | Status: ACTIVE | COMMUNITY
Start: 2024-12-02 | End: 1900-01-01

## 2024-12-03 ENCOUNTER — NON-APPOINTMENT (OUTPATIENT)
Age: 84
End: 2024-12-03

## 2025-01-15 ENCOUNTER — APPOINTMENT (OUTPATIENT)
Dept: UROGYNECOLOGY | Facility: CLINIC | Age: 85
End: 2025-01-15

## 2025-01-15 DIAGNOSIS — K62.9 DISEASE OF ANUS AND RECTUM, UNSPECIFIED: ICD-10-CM

## 2025-01-15 DIAGNOSIS — N81.6 RECTOCELE: ICD-10-CM

## 2025-01-15 DIAGNOSIS — R15.9 DISEASE OF ANUS AND RECTUM, UNSPECIFIED: ICD-10-CM

## 2025-01-15 DIAGNOSIS — R39.15 URGENCY OF URINATION: ICD-10-CM

## 2025-01-15 DIAGNOSIS — R35.1 NOCTURIA: ICD-10-CM

## 2025-01-15 DIAGNOSIS — N39.46 MIXED INCONTINENCE: ICD-10-CM

## 2025-01-15 PROCEDURE — 81003 URINALYSIS AUTO W/O SCOPE: CPT | Mod: QW

## 2025-01-15 PROCEDURE — 99204 OFFICE O/P NEW MOD 45 MIN: CPT | Mod: 25

## 2025-01-15 PROCEDURE — 51701 INSERT BLADDER CATHETER: CPT | Mod: 59

## 2025-01-15 PROCEDURE — 99459 PELVIC EXAMINATION: CPT

## 2025-01-16 LAB
APPEARANCE: CLEAR
BACTERIA: NEGATIVE /HPF
BILIRUBIN URINE: NEGATIVE
BLOOD URINE: NEGATIVE
CAST: 0 /LPF
COLOR: YELLOW
EPITHELIAL CELLS: 0 /HPF
GLUCOSE QUALITATIVE U: NEGATIVE MG/DL
KETONES URINE: NEGATIVE MG/DL
LEUKOCYTE ESTERASE URINE: NEGATIVE
MICROSCOPIC-UA: NORMAL
NITRITE URINE: NEGATIVE
PH URINE: 6.5
PROTEIN URINE: NEGATIVE MG/DL
RED BLOOD CELLS URINE: 0 /HPF
SPECIFIC GRAVITY URINE: 1.01
UROBILINOGEN URINE: 0.2 MG/DL
WHITE BLOOD CELLS URINE: 0 /HPF

## 2025-01-17 LAB — BACTERIA UR CULT: NORMAL

## 2025-01-30 ENCOUNTER — APPOINTMENT (OUTPATIENT)
Dept: MRI IMAGING | Facility: CLINIC | Age: 85
End: 2025-01-30
Payer: MEDICARE

## 2025-01-30 PROCEDURE — 72195 MRI PELVIS W/O DYE: CPT

## 2025-02-03 ENCOUNTER — RESULT CHARGE (OUTPATIENT)
Age: 85
End: 2025-02-03

## 2025-02-03 ENCOUNTER — APPOINTMENT (OUTPATIENT)
Dept: UROGYNECOLOGY | Facility: CLINIC | Age: 85
End: 2025-02-03

## 2025-02-03 DIAGNOSIS — N81.6 RECTOCELE: ICD-10-CM

## 2025-02-03 DIAGNOSIS — R39.15 URGENCY OF URINATION: ICD-10-CM

## 2025-02-03 DIAGNOSIS — N39.46 MIXED INCONTINENCE: ICD-10-CM

## 2025-02-03 PROCEDURE — 51741 ELECTRO-UROFLOWMETRY FIRST: CPT

## 2025-02-03 PROCEDURE — 51728 CYSTOMETROGRAM W/VP: CPT

## 2025-02-03 PROCEDURE — 51784 ANAL/URINARY MUSCLE STUDY: CPT

## 2025-02-04 LAB
APPEARANCE: CLEAR
BACTERIA: NEGATIVE /HPF
BILIRUBIN URINE: NEGATIVE
BLOOD URINE: NEGATIVE
CAST: 4 /LPF
COLOR: NORMAL
EPITHELIAL CELLS: 1 /HPF
GLUCOSE QUALITATIVE U: NEGATIVE MG/DL
KETONES URINE: ABNORMAL MG/DL
LEUKOCYTE ESTERASE URINE: NEGATIVE
MICROSCOPIC-UA: NORMAL
NITRITE URINE: NEGATIVE
PH URINE: 6
PROTEIN URINE: NORMAL MG/DL
RED BLOOD CELLS URINE: 1 /HPF
REVIEW: NORMAL
SPECIFIC GRAVITY URINE: 1.02
UROBILINOGEN URINE: 0.2 MG/DL
WHITE BLOOD CELLS URINE: 0 /HPF

## 2025-02-05 ENCOUNTER — NON-APPOINTMENT (OUTPATIENT)
Age: 85
End: 2025-02-05

## 2025-02-05 ENCOUNTER — APPOINTMENT (OUTPATIENT)
Dept: UROGYNECOLOGY | Facility: CLINIC | Age: 85
End: 2025-02-05
Payer: MEDICARE

## 2025-02-05 DIAGNOSIS — R35.1 NOCTURIA: ICD-10-CM

## 2025-02-05 LAB — BACTERIA UR CULT: NORMAL

## 2025-02-05 PROCEDURE — 99213 OFFICE O/P EST LOW 20 MIN: CPT

## 2025-02-06 LAB
APPEARANCE: ABNORMAL
BACTERIA: NEGATIVE /HPF
BILIRUBIN URINE: NEGATIVE
BLOOD URINE: NEGATIVE
CALCIUM OXALATE CRYSTALS: PRESENT
CAST: 1 /LPF
COLOR: NORMAL
EPITHELIAL CELLS: 3 /HPF
GLUCOSE QUALITATIVE U: NEGATIVE MG/DL
KETONES URINE: NEGATIVE MG/DL
LEUKOCYTE ESTERASE URINE: ABNORMAL
MICROSCOPIC-UA: NORMAL
NITRITE URINE: NEGATIVE
PH URINE: 5.5
PROTEIN URINE: NEGATIVE MG/DL
RED BLOOD CELLS URINE: 3 /HPF
REVIEW: NORMAL
SPECIFIC GRAVITY URINE: 1.02
UROBILINOGEN URINE: 0.2 MG/DL
WHITE BLOOD CELLS URINE: 2 /HPF

## 2025-02-07 ENCOUNTER — NON-APPOINTMENT (OUTPATIENT)
Age: 85
End: 2025-02-07

## 2025-02-07 LAB — BACTERIA UR CULT: NORMAL

## 2025-02-24 ENCOUNTER — APPOINTMENT (OUTPATIENT)
Dept: CARDIOLOGY | Facility: CLINIC | Age: 85
End: 2025-02-24
Payer: MEDICARE

## 2025-02-24 ENCOUNTER — NON-APPOINTMENT (OUTPATIENT)
Age: 85
End: 2025-02-24

## 2025-02-24 VITALS
BODY MASS INDEX: 21.99 KG/M2 | OXYGEN SATURATION: 98 % | HEART RATE: 66 BPM | SYSTOLIC BLOOD PRESSURE: 148 MMHG | HEIGHT: 65 IN | WEIGHT: 132 LBS | DIASTOLIC BLOOD PRESSURE: 70 MMHG

## 2025-02-24 DIAGNOSIS — Z29.89 ENCOUNTER. FOR OTHER SPECIFIED PROPHYLACTIC MEASURES: ICD-10-CM

## 2025-02-24 DIAGNOSIS — I11.9 HYPERTENSIVE HEART DISEASE W/OUT HEART FAILURE: ICD-10-CM

## 2025-02-24 DIAGNOSIS — I42.9 CARDIOMYOPATHY, UNSPECIFIED: ICD-10-CM

## 2025-02-24 DIAGNOSIS — E78.5 HYPERLIPIDEMIA, UNSPECIFIED: ICD-10-CM

## 2025-02-24 DIAGNOSIS — Z01.810 ENCOUNTER FOR PREPROCEDURAL CARDIOVASCULAR EXAMINATION: ICD-10-CM

## 2025-02-24 PROCEDURE — 99214 OFFICE O/P EST MOD 30 MIN: CPT

## 2025-02-24 PROCEDURE — 93000 ELECTROCARDIOGRAM COMPLETE: CPT

## 2025-02-24 PROCEDURE — G2211 COMPLEX E/M VISIT ADD ON: CPT

## 2025-02-24 RX ORDER — AMOXICILLIN 500 MG/1
500 CAPSULE ORAL
Qty: 4 | Refills: 3 | Status: ACTIVE | COMMUNITY
Start: 2025-02-24 | End: 1900-01-01

## 2025-03-10 ENCOUNTER — APPOINTMENT (OUTPATIENT)
Dept: CARDIOLOGY | Facility: CLINIC | Age: 85
End: 2025-03-10

## 2025-05-27 ENCOUNTER — APPOINTMENT (OUTPATIENT)
Dept: CARDIOLOGY | Facility: CLINIC | Age: 85
End: 2025-05-27
Payer: MEDICARE

## 2025-05-27 VITALS
WEIGHT: 130 LBS | HEART RATE: 63 BPM | DIASTOLIC BLOOD PRESSURE: 52 MMHG | SYSTOLIC BLOOD PRESSURE: 116 MMHG | OXYGEN SATURATION: 98 % | HEIGHT: 65 IN | BODY MASS INDEX: 21.66 KG/M2

## 2025-05-27 DIAGNOSIS — I10 ESSENTIAL (PRIMARY) HYPERTENSION: ICD-10-CM

## 2025-05-27 DIAGNOSIS — E78.5 HYPERLIPIDEMIA, UNSPECIFIED: ICD-10-CM

## 2025-05-27 DIAGNOSIS — I65.23 OCCLUSION AND STENOSIS OF BILATERAL CAROTID ARTERIES: ICD-10-CM

## 2025-05-27 DIAGNOSIS — R94.31 ABNORMAL ELECTROCARDIOGRAM [ECG] [EKG]: ICD-10-CM

## 2025-05-27 PROCEDURE — 99214 OFFICE O/P EST MOD 30 MIN: CPT

## 2025-05-28 RX ORDER — VENLAFAXINE 75 MG/1
75 TABLET ORAL DAILY
Refills: 0 | Status: DISCONTINUED | COMMUNITY
End: 2025-05-28

## 2025-05-28 RX ORDER — VENLAFAXINE HYDROCHLORIDE 150 MG/1
150 TABLET, EXTENDED RELEASE ORAL
Refills: 0 | Status: ACTIVE | COMMUNITY
Start: 2025-05-28

## 2025-06-09 ENCOUNTER — OFFICE (OUTPATIENT)
Dept: URBAN - METROPOLITAN AREA CLINIC 8 | Facility: CLINIC | Age: 85
Setting detail: OPHTHALMOLOGY
End: 2025-06-09
Payer: MEDICARE

## 2025-06-09 DIAGNOSIS — H35.3131: ICD-10-CM

## 2025-06-09 DIAGNOSIS — H01.001: ICD-10-CM

## 2025-06-09 DIAGNOSIS — H43.813: ICD-10-CM

## 2025-06-09 DIAGNOSIS — H35.413: ICD-10-CM

## 2025-06-09 DIAGNOSIS — H33.311: ICD-10-CM

## 2025-06-09 DIAGNOSIS — H26.493: ICD-10-CM

## 2025-06-09 DIAGNOSIS — H11.153: ICD-10-CM

## 2025-06-09 DIAGNOSIS — H01.004: ICD-10-CM

## 2025-06-09 DIAGNOSIS — Z96.1: ICD-10-CM

## 2025-06-09 PROCEDURE — 92014 COMPRE OPH EXAM EST PT 1/>: CPT | Performed by: OPHTHALMOLOGY

## 2025-06-09 ASSESSMENT — REFRACTION_AUTOREFRACTION
OD_SPHERE: -1.25
OS_SPHERE: -1.25
OS_AXIS: 079
OD_CYLINDER: -0.25
OD_AXIS: 046
OS_CYLINDER: -0.25

## 2025-06-09 ASSESSMENT — KERATOMETRY
OD_K1POWER_DIOPTERS: 45.25
OS_K2POWER_DIOPTERS: 45.75
OD_K2POWER_DIOPTERS: 45.75
OS_K1POWER_DIOPTERS: 45.50
OS_AXISANGLE_DEGREES: 091
METHOD_AUTO_MANUAL: AUTO
OD_AXISANGLE_DEGREES: 087

## 2025-06-09 ASSESSMENT — REFRACTION_CURRENTRX
OD_OVR_VA: 20/
OD_SPHERE: -0.75
OS_VPRISM_DIRECTION: SV
OS_OVR_VA: 20/
OD_VPRISM_DIRECTION: SV
OD_CYLINDER: -0.50
OD_AXIS: 091
OS_OVR_VA: 20/
OD_OVR_VA: 20/
OS_AXIS: 047
OS_SPHERE: -0.75
OS_CYLINDER: -0.50

## 2025-06-09 ASSESSMENT — VISUAL ACUITY
OS_BCVA: 20/30
OD_BCVA: 20/50-2

## 2025-06-09 ASSESSMENT — REFRACTION_MANIFEST
OS_VA1: 20/40
OS_CYLINDER: -0.50
OS_ADD: +2.75
OD_ADD: +2.75
OD_SPHERE: -1.25
OS_VA2: 20/25(J1)
OD_VA2: 20/25(J1)
OD_CYLINDER: -0.50
OS_CYLINDER: -0.25
OD_AXIS: 045
OD_VA1: 20/25
OD_ADD: +2.75
OS_VA1: 20/40+2
OS_SPHERE: -0.75
OS_ADD: +2.75
OD_VA2: 20/20
OS_VA2: 20/20
OS_SPHERE: -1.25
OU_VA: 20/30
OS_AXIS: 080
OU_VA: 20/25-
OS_AXIS: 050
OD_CYLINDER: -0.25
OD_VA1: 20/30
OD_SPHERE: -0.75
OD_AXIS: 090

## 2025-06-09 ASSESSMENT — LID EXAM ASSESSMENTS
OS_BLEPHARITIS: LUL 2+
OD_BLEPHARITIS: RUL 2+

## 2025-06-09 ASSESSMENT — CONFRONTATIONAL VISUAL FIELD TEST (CVF)
OD_FINDINGS: FULL
OS_FINDINGS: FULL

## 2025-06-24 NOTE — CARDIOLOGY SUMMARY
[de-identified] : January 31, 2022.  Sinus bradycardia. [de-identified] : December 14, 2021.  LVEF 60% mild mitral and minimal aortic regurgitation severely dilated left atrium [de-identified] : Bilateral carotid Doppler study.  December 14, 2021.  R ICA less than 50% left ICA 50 to 69%. 83

## 2025-07-17 ENCOUNTER — APPOINTMENT (OUTPATIENT)
Dept: CARDIOLOGY | Facility: CLINIC | Age: 85
End: 2025-07-17

## 2025-08-28 ENCOUNTER — APPOINTMENT (OUTPATIENT)
Dept: CARDIOLOGY | Facility: CLINIC | Age: 85
End: 2025-08-28